# Patient Record
Sex: FEMALE | Race: WHITE | NOT HISPANIC OR LATINO | Employment: STUDENT | URBAN - METROPOLITAN AREA
[De-identification: names, ages, dates, MRNs, and addresses within clinical notes are randomized per-mention and may not be internally consistent; named-entity substitution may affect disease eponyms.]

---

## 2022-12-16 ENCOUNTER — TELEPHONE (OUTPATIENT)
Dept: SCHEDULING | Facility: IMAGING CENTER | Age: 22
End: 2022-12-16

## 2022-12-19 ENCOUNTER — OFFICE VISIT (OUTPATIENT)
Dept: MEDICAL GROUP | Facility: MEDICAL CENTER | Age: 22
End: 2022-12-19
Payer: COMMERCIAL

## 2022-12-19 VITALS
RESPIRATION RATE: 16 BRPM | TEMPERATURE: 98.5 F | SYSTOLIC BLOOD PRESSURE: 130 MMHG | DIASTOLIC BLOOD PRESSURE: 70 MMHG | HEIGHT: 65 IN | OXYGEN SATURATION: 98 % | BODY MASS INDEX: 23.14 KG/M2 | HEART RATE: 101 BPM | WEIGHT: 138.89 LBS

## 2022-12-19 DIAGNOSIS — Q23.1 BICUSPID AORTIC VALVE: ICD-10-CM

## 2022-12-19 DIAGNOSIS — Z11.59 NEED FOR HEPATITIS C SCREENING TEST: ICD-10-CM

## 2022-12-19 DIAGNOSIS — E78.6 LOW HDL (UNDER 40): ICD-10-CM

## 2022-12-19 DIAGNOSIS — Z23 NEED FOR VACCINATION: ICD-10-CM

## 2022-12-19 DIAGNOSIS — K13.70 DISEASE OF THE ORAL SOFT TISSUES: ICD-10-CM

## 2022-12-19 DIAGNOSIS — Z01.84 IMMUNITY STATUS TESTING: ICD-10-CM

## 2022-12-19 DIAGNOSIS — Z11.4 SCREENING FOR HIV (HUMAN IMMUNODEFICIENCY VIRUS): ICD-10-CM

## 2022-12-19 DIAGNOSIS — Z13.1 SCREENING FOR DIABETES MELLITUS: ICD-10-CM

## 2022-12-19 DIAGNOSIS — F41.8 OTHER SPECIFIED ANXIETY DISORDERS: ICD-10-CM

## 2022-12-19 PROCEDURE — 90471 IMMUNIZATION ADMIN: CPT | Performed by: FAMILY MEDICINE

## 2022-12-19 PROCEDURE — 90621 MENB-FHBP VACC 2/3 DOSE IM: CPT | Performed by: FAMILY MEDICINE

## 2022-12-19 PROCEDURE — 99204 OFFICE O/P NEW MOD 45 MIN: CPT | Mod: 25 | Performed by: FAMILY MEDICINE

## 2022-12-19 RX ORDER — MEDROXYPROGESTERONE ACETATE 150 MG/ML
INJECTION, SUSPENSION INTRAMUSCULAR
COMMUNITY
End: 2024-01-11

## 2022-12-19 ASSESSMENT — ENCOUNTER SYMPTOMS
FOCAL WEAKNESS: 0
NERVOUS/ANXIOUS: 0
CHILLS: 0
HEADACHES: 0
VOMITING: 0
DEPRESSION: 0
SINUS PAIN: 0
EYE PAIN: 0
NAUSEA: 0
SENSORY CHANGE: 0
MYALGIAS: 0
COUGH: 0
DIARRHEA: 0
WEIGHT LOSS: 0
SPUTUM PRODUCTION: 0
SHORTNESS OF BREATH: 0
ABDOMINAL PAIN: 0
WHEEZING: 0
DIZZINESS: 0
CONSTIPATION: 0
EYE REDNESS: 0
HEMOPTYSIS: 0
FEVER: 0
EYE DISCHARGE: 0
PALPITATIONS: 0

## 2022-12-19 ASSESSMENT — PATIENT HEALTH QUESTIONNAIRE - PHQ9: CLINICAL INTERPRETATION OF PHQ2 SCORE: 0
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
## 2022-12-19 NOTE — LETTER
[employer]  Phoenix Delacruz M.D.  [address]  [place] [address]  Fax: [phone]   Authorization for Release/Disclosure of   Protected Health Information   Name: MINOR MITCHELL : 2000 SSN: [id number]   Address: [address] [place] [address] [address] [place] [place] [address] Phone:    [phone] (home)    I authorize the entity listed below to release/disclose the PHI below to:   [employer]/Phoenix Delacruz M.D. and Phoenix Delacruz M.D.   Provider or Entity Name:     Address   City, State, Zip   Phone:      Fax:     Reason for request: continuity of care   Information to be released:    [  ] LAST COLONOSCOPY,  including any PATH REPORT and follow-up  [  ] LAST FIT/COLOGUARD RESULT [  ] LAST DEXA  [  ] LAST MAMMOGRAM  [  ] LAST PAP  [  ] LAST LABS [  ] RETINA EXAM REPORT  [  ] IMMUNIZATION RECORDS  [  ] Release all info      [  ] Check here and initial the line next to each item to release ALL health information INCLUDING  _____ Care and treatment for drug and / or alcohol abuse  _____ HIV testing, infection status, or AIDS  _____ Genetic Testing    DATES OF SERVICE OR TIME PERIOD TO BE DISCLOSED: _____________  I understand and acknowledge that:  * This Authorization may be revoked at any time by you in writing, except if your health information has already been used or disclosed.  * Your health information that will be used or disclosed as a result of you signing this authorization could be re-disclosed by the recipient. If this occurs, your re-disclosed health information may no longer be protected by State or Federal laws.  * You may refuse to sign this Authorization. Your refusal will not affect your ability to obtain treatment.  * This Authorization becomes effective upon signing and will  on (date) __________.      If no date is indicated, this Authorization will  one (1) year from the signature date.    Name: Minor Mitchell    Signature:   Date:     2022       PLEASE FAX  REQUESTED RECORDS BACK TO: [phone]

## 2022-12-19 NOTE — LETTER
[employer]  Phoenix Delacruz M.D.  [address]  [place] [address]  Fax: [phone]   Authorization for Release/Disclosure of   Protected Health Information   Name: MINOR MITCHELL : 2000 SSN: [id number]   Address: [address] [place] [address] [address] [place] [address] Phone:    [phone] (home)    I authorize the entity listed below to release/disclose the PHI below to:   [employer]/Phoenix Delacruz M.D. and Phoenix Dleacruz M.D.   Provider or Entity Name:     Address   City, State, Zip   Phone:      Fax:     Reason for request: continuity of care   Information to be released:    [  ] LAST COLONOSCOPY,  including any PATH REPORT and follow-up  [  ] LAST FIT/COLOGUARD RESULT [  ] LAST DEXA  [  ] LAST MAMMOGRAM  [  ] LAST PAP  [  ] LAST LABS [  ] RETINA EXAM REPORT  [  ] IMMUNIZATION RECORDS  [  ] Release all info      [  ] Check here and initial the line next to each item to release ALL health information INCLUDING  _____ Care and treatment for drug and / or alcohol abuse  _____ HIV testing, infection status, or AIDS  _____ Genetic Testing    DATES OF SERVICE OR TIME PERIOD TO BE DISCLOSED: _____________  I understand and acknowledge that:  * This Authorization may be revoked at any time by you in writing, except if your health information has already been used or disclosed.  * Your health information that will be used or disclosed as a result of you signing this authorization could be re-disclosed by the recipient. If this occurs, your re-disclosed health information may no longer be protected by State or Federal laws.  * You may refuse to sign this Authorization. Your refusal will not affect your ability to obtain treatment.  * This Authorization becomes effective upon signing and will  on (date) __________.      If no date is indicated, this Authorization will  one (1) year from the signature date.    Name: Minor Mitchell    Signature:   Date:     2022       PLEASE FAX  REQUESTED RECORDS BACK TO: [phone]

## 2023-01-03 ENCOUNTER — HOSPITAL ENCOUNTER (OUTPATIENT)
Dept: LAB | Facility: MEDICAL CENTER | Age: 23
End: 2023-01-03
Attending: FAMILY MEDICINE
Payer: COMMERCIAL

## 2023-01-03 ENCOUNTER — TELEPHONE (OUTPATIENT)
Dept: HEALTH INFORMATION MANAGEMENT | Facility: OTHER | Age: 23
End: 2023-01-03
Payer: COMMERCIAL

## 2023-01-03 DIAGNOSIS — E78.6 LOW HDL (UNDER 40): ICD-10-CM

## 2023-01-03 DIAGNOSIS — Z13.1 SCREENING FOR DIABETES MELLITUS: ICD-10-CM

## 2023-01-03 DIAGNOSIS — Z11.59 NEED FOR HEPATITIS C SCREENING TEST: ICD-10-CM

## 2023-01-03 DIAGNOSIS — Z11.4 SCREENING FOR HIV (HUMAN IMMUNODEFICIENCY VIRUS): ICD-10-CM

## 2023-01-03 DIAGNOSIS — Z01.84 IMMUNITY STATUS TESTING: ICD-10-CM

## 2023-01-03 LAB
ALBUMIN SERPL BCP-MCNC: 5 G/DL (ref 3.2–4.9)
ALBUMIN/GLOB SERPL: 1.8 G/DL
ALP SERPL-CCNC: 55 U/L (ref 30–99)
ALT SERPL-CCNC: 14 U/L (ref 2–50)
ANION GAP SERPL CALC-SCNC: 13 MMOL/L (ref 7–16)
AST SERPL-CCNC: 19 U/L (ref 12–45)
BILIRUB SERPL-MCNC: 0.7 MG/DL (ref 0.1–1.5)
BUN SERPL-MCNC: 10 MG/DL (ref 8–22)
CALCIUM ALBUM COR SERPL-MCNC: 9.2 MG/DL (ref 8.5–10.5)
CALCIUM SERPL-MCNC: 10 MG/DL (ref 8.4–10.2)
CHLORIDE SERPL-SCNC: 104 MMOL/L (ref 96–112)
CHOLEST SERPL-MCNC: 123 MG/DL (ref 100–199)
CO2 SERPL-SCNC: 24 MMOL/L (ref 20–33)
CREAT SERPL-MCNC: 0.61 MG/DL (ref 0.5–1.4)
FASTING STATUS PATIENT QL REPORTED: NORMAL
GFR SERPLBLD CREATININE-BSD FMLA CKD-EPI: 128 ML/MIN/1.73 M 2
GLOBULIN SER CALC-MCNC: 2.8 G/DL (ref 1.9–3.5)
GLUCOSE SERPL-MCNC: 85 MG/DL (ref 65–99)
HBV SURFACE AB SERPL IA-ACNC: 185 MIU/ML (ref 0–10)
HCV AB SER QL: NORMAL
HDLC SERPL-MCNC: 45 MG/DL
HIV 1+2 AB+HIV1 P24 AG SERPL QL IA: NORMAL
LDLC SERPL CALC-MCNC: 64 MG/DL
POTASSIUM SERPL-SCNC: 3.7 MMOL/L (ref 3.6–5.5)
PROT SERPL-MCNC: 7.8 G/DL (ref 6–8.2)
RUBV AB SER QL: 172 IU/ML
SODIUM SERPL-SCNC: 141 MMOL/L (ref 135–145)
TRIGL SERPL-MCNC: 69 MG/DL (ref 0–149)

## 2023-01-03 PROCEDURE — 86787 VARICELLA-ZOSTER ANTIBODY: CPT

## 2023-01-03 PROCEDURE — 86803 HEPATITIS C AB TEST: CPT

## 2023-01-03 PROCEDURE — 86762 RUBELLA ANTIBODY: CPT

## 2023-01-03 PROCEDURE — 86765 RUBEOLA ANTIBODY: CPT

## 2023-01-03 PROCEDURE — 86735 MUMPS ANTIBODY: CPT

## 2023-01-03 PROCEDURE — 80061 LIPID PANEL: CPT

## 2023-01-03 PROCEDURE — 80053 COMPREHEN METABOLIC PANEL: CPT

## 2023-01-03 PROCEDURE — 36415 COLL VENOUS BLD VENIPUNCTURE: CPT

## 2023-01-03 PROCEDURE — 86706 HEP B SURFACE ANTIBODY: CPT

## 2023-01-03 PROCEDURE — 87389 HIV-1 AG W/HIV-1&-2 AB AG IA: CPT

## 2023-01-06 LAB
MEV IGG SER-ACNC: >300 AU/ML
MUV IGG SER IA-ACNC: >300 AU/ML
VZV IGG SER IA-ACNC: 1753 IV

## 2023-03-02 ENCOUNTER — OFFICE VISIT (OUTPATIENT)
Dept: MEDICAL GROUP | Facility: MEDICAL CENTER | Age: 23
End: 2023-03-02
Payer: COMMERCIAL

## 2023-03-02 VITALS
DIASTOLIC BLOOD PRESSURE: 62 MMHG | BODY MASS INDEX: 23.88 KG/M2 | HEIGHT: 65 IN | WEIGHT: 143.3 LBS | RESPIRATION RATE: 16 BRPM | OXYGEN SATURATION: 96 % | TEMPERATURE: 97.4 F | HEART RATE: 107 BPM | SYSTOLIC BLOOD PRESSURE: 114 MMHG

## 2023-03-02 DIAGNOSIS — F32.1 CURRENT MODERATE EPISODE OF MAJOR DEPRESSIVE DISORDER WITHOUT PRIOR EPISODE (HCC): ICD-10-CM

## 2023-03-02 DIAGNOSIS — E78.6 LOW HDL (UNDER 40): ICD-10-CM

## 2023-03-02 DIAGNOSIS — R77.9 ELEVATED BLOOD PROTEIN: ICD-10-CM

## 2023-03-02 PROCEDURE — 99214 OFFICE O/P EST MOD 30 MIN: CPT | Performed by: FAMILY MEDICINE

## 2023-03-02 RX ORDER — SERTRALINE HYDROCHLORIDE 25 MG/1
25 TABLET, FILM COATED ORAL DAILY
Qty: 60 TABLET | Refills: 1 | Status: SHIPPED | OUTPATIENT
Start: 2023-03-02 | End: 2024-01-11 | Stop reason: SDUPTHER

## 2023-03-02 ASSESSMENT — ENCOUNTER SYMPTOMS
CHILLS: 0
FEVER: 0
INSOMNIA: 1
PALPITATIONS: 0
DEPRESSION: 1

## 2023-03-02 ASSESSMENT — PATIENT HEALTH QUESTIONNAIRE - PHQ9
SUM OF ALL RESPONSES TO PHQ QUESTIONS 1-9: 14
5. POOR APPETITE OR OVEREATING: 3 - NEARLY EVERY DAY
CLINICAL INTERPRETATION OF PHQ2 SCORE: 4

## 2023-03-02 ASSESSMENT — FIBROSIS 4 INDEX: FIB4 SCORE: 0.36

## 2023-03-02 NOTE — PROGRESS NOTES
FAMILY MEDICINE VISIT                                                               Chief complaint::Diagnoses of Low HDL (under 40), Elevated blood protein, and Current moderate episode of major depressive disorder without prior episode (HCC) were pertinent to this visit.    History of present illness: Minor Arita is a 22 y.o. female who presented for lab follow up, depression symptoms    Problem   Elevated Blood Protein    Recent labs showed mildly elevated albumin level, other protein levels are normal.  Kidney function, liver function electrolytes are also normal.     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  She has a lot of stress at home.  She reports that she has history of PTSD and was on Zoloft before, would like to go back on medication as that has been a lot of stress going on.    Depression Screening    Little interest or pleasure in doing things?  2 - more than half the days   Feeling down, depressed , or hopeless? 2 - more than half the days   Trouble falling or staying asleep, or sleeping too much?  2 - more than half the days   Feeling tired or having little energy?  2 - more than half the days   Poor appetite or overeating?  3 - nearly every day   Feeling bad about yourself - or that you are a failure or have let yourself or your family down? 2 - more than half the days   Trouble concentrating on things, such as reading the newspaper or watching television? 1 - several days   Moving or speaking so slowly that other people could have noticed.  Or the opposite - being so fidgety or restless that you have been moving around a lot more than usual?  0 - not at all   Thoughts that you would be better off dead, or of hurting yourself?  0 - not at all   Patient Health Questionnaire Score: 14       If depressive symptoms identified deferred to follow up visit  "unless specifically addressed in assesment and plan.    Interpretation of PHQ-9 Total Score   Score Severity   1-4 No Depression   5-9 Mild Depression   10-14 Moderate Depression   15-19 Moderately Severe Depression   20-27 Severe Depression       Low Hdl (Under 40)    Has previous history of low HDL.  Recent labs showed cholesterol levels in normal range.    Lab Results   Component Value Date/Time    CHOLSTRLTOT 123 01/03/2023 1642    TRIGLYCERIDE 69 01/03/2023 1642    HDL 45 01/03/2023 1642    LDL 64 01/03/2023 1642               Review of systems:     Review of Systems   Constitutional:  Positive for malaise/fatigue. Negative for chills and fever.   Cardiovascular:  Negative for chest pain, palpitations and leg swelling.   Psychiatric/Behavioral:  Positive for depression. The patient has insomnia.       Medications and Allergies:     Current Outpatient Medications   Medication Sig Dispense Refill    sertraline (ZOLOFT) 25 MG tablet Take 1 Tablet by mouth every day. 60 Tablet 1    medroxyPROGESTERone (DEPO-PROVERA) 150 MG/ML Suspension Inject  into the shoulder, thigh, or buttocks.       No current facility-administered medications for this visit.          Vitals:    /62   Pulse (!) 107   Temp 36.3 °C (97.4 °F)   Resp 16   Ht 1.651 m (5' 5\")   Wt 65 kg (143 lb 4.8 oz)   SpO2 96%  Body mass index is 23.85 kg/m².    Physical Exam:     Physical Exam  Constitutional:       Appearance: Normal appearance. She is well-developed and well-groomed.   HENT:      Head: Normocephalic and atraumatic.      Right Ear: External ear normal.      Left Ear: External ear normal.   Eyes:      General:         Right eye: No discharge.         Left eye: No discharge.      Conjunctiva/sclera: Conjunctivae normal.   Cardiovascular:      Rate and Rhythm: Normal rate.   Pulmonary:      Effort: Pulmonary effort is normal. No respiratory distress.   Musculoskeletal:      Cervical back: Neck supple.   Skin:     Findings: No rash. "   Neurological:      Mental Status: She is alert.   Psychiatric:         Mood and Affect: Mood and affect normal.         Behavior: Behavior normal.        Labs:  I reviewed with patient recent labs resulted on 1/3/2023.    Assessment/Plan:         Problem List Items Addressed This Visit       Low HDL (under 40)     Chronic problem, stable, recheck labs in 1 year.  Continue to eat healthy diet and exercise.         Elevated blood protein     New problem, very mildly elevated protein level which could be due to dietary intake.  Recheck labs in 1 year.  If elevated we will do serum protein electrophoresis.         Current moderate episode of major depressive disorder without prior episode (HCC)     New problem, unstable, start Zoloft 25 mg daily.  Discussed side effects of this medication with patient.  We will follow-up with her in 2 months.         Relevant Medications    sertraline (ZOLOFT) 25 MG tablet    Other Relevant Orders    Patient has been identified as having a positive depression screening. Appropriate orders and counseling have been given. (Completed)        Please note that this dictation was created using voice recognition software. I have made every reasonable attempt to correct obvious errors, but I expect that there are errors of grammar and possibly content that I did not discover before finalizing the note.    Follow up in 2 months for medication follow-up.

## 2023-03-02 NOTE — ASSESSMENT & PLAN NOTE
New problem, unstable, start Zoloft 25 mg daily.  Discussed side effects of this medication with patient.  We will follow-up with her in 2 months.

## 2023-03-02 NOTE — ASSESSMENT & PLAN NOTE
New problem, very mildly elevated protein level which could be due to dietary intake.  Recheck labs in 1 year.  If elevated we will do serum protein electrophoresis.

## 2023-03-10 ENCOUNTER — TELEPHONE (OUTPATIENT)
Dept: CARDIOLOGY | Facility: MEDICAL CENTER | Age: 23
End: 2023-03-10
Payer: COMMERCIAL

## 2023-03-10 NOTE — TELEPHONE ENCOUNTER
Pt is coming in for Bicuspid aortic valve. Pt referred by Phoenix Delacruz M.D..    Spoke with patient and confirmed patient has seen previous cardiologist. Per pt was seen at [facility]. Pt will also bringing in records of her own. Per chart review records are not in patients chart. All cardiac records requested for upcoming NP appointment.  Fax confirmation received. Appointment time, date, and location verified with patient.     Pending outside medical records.

## 2023-03-23 ENCOUNTER — OFFICE VISIT (OUTPATIENT)
Dept: CARDIOLOGY | Facility: MEDICAL CENTER | Age: 23
End: 2023-03-23
Payer: COMMERCIAL

## 2023-03-23 VITALS
HEART RATE: 111 BPM | BODY MASS INDEX: 22.99 KG/M2 | HEIGHT: 65 IN | RESPIRATION RATE: 16 BRPM | OXYGEN SATURATION: 98 % | DIASTOLIC BLOOD PRESSURE: 76 MMHG | SYSTOLIC BLOOD PRESSURE: 104 MMHG | WEIGHT: 138 LBS

## 2023-03-23 DIAGNOSIS — Q23.1 BICUSPID AORTIC VALVE: ICD-10-CM

## 2023-03-23 LAB — EKG IMPRESSION: NORMAL

## 2023-03-23 PROCEDURE — 99203 OFFICE O/P NEW LOW 30 MIN: CPT | Performed by: INTERNAL MEDICINE

## 2023-03-23 PROCEDURE — 93000 ELECTROCARDIOGRAM COMPLETE: CPT | Performed by: INTERNAL MEDICINE

## 2023-03-23 ASSESSMENT — ENCOUNTER SYMPTOMS
COUGH: 0
HEMATOCHEZIA: 0
DIAPHORESIS: 0
FEVER: 0
HEMOPTYSIS: 0
WHEEZING: 0

## 2023-03-23 ASSESSMENT — FIBROSIS 4 INDEX: FIB4 SCORE: 0.36

## 2023-03-23 NOTE — PROGRESS NOTES
Cardiology Initial Consultation Note    Date of note: 3/22/2023    Primary Care Provider: Phoenix Delacruz M.D.  Referring Provider: No ref. provider found    Patient Name: Minor Arita  YOB: 2000  MRN:              [id number]    Chief Complaint   Patient presents with    Aortic Stenosis     NP Dx: Bicuspid aortic valve     History of Present Illness: Ms. Minor Arita is a 22 y.o. female whose current medical problems include bicuspid aortic valve and depression who is here for cardiac consultation for follow-up of bicuspid aortic valve.    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  She reports she has no complaints.  She has had chest pain occasionally maybe twice a month for years and it has not changed and not concerning for her.  She denies any shortness of breath, no orthopnea, no PND, no decrease in exercise Tolerance, no lightheadedness, palpitations, dizziness, or syncope.    She reports she used to see her pediatric cardiologist every 3 years where they would do follow-up echoes and she is okay with that plan.  The plan was to have and a follow-up echo around October as it would have been 3 years from her last echo and that is why she is here to establish.    Cardiovascular Risk Factors:  1. Smoking status:   Tobacco Use: Low Risk     Smoking Tobacco Use: Never    Smokeless Tobacco Use: Never    Passive Exposure: Not on file     2. Type II Diabetes Mellitus: No  Lab Results   Component Value Date/Time    HBA1C 4.9 09/06/2022 03:51 PM    HBA1C 5.0 09/17/2020 12:04 PM     3. Hypertension: No  4. Dyslipidemia: No  Cholesterol,Tot   Date Value Ref Range Status   01/03/2023 123 100 - 199 mg/dL Final     LDL   Date Value Ref Range Status   01/03/2023 64 <100 mg/dL Final     HDL   Date Value Ref Range Status   01/03/2023 45 >=40 mg/dL Final     Triglycerides   Date Value Ref Range Status   01/03/2023 69 0 - 149 mg/dL Final     5. Family history of early Coronary Artery Disease  "in a first degree relative (Male less than 55 years of age; Female less than 65 years of age): Although her mother had a heart attack in her 40s, she was abusing drug at the time.  Her brother  unexpectedly while undergoing a valve replacement.    Past Medical History:   Diagnosis Date    Bicuspid aortic valve        Past Surgical History:   Procedure Laterality Date    CLEFT PALATE REPAIR         Current Outpatient Medications   Medication Sig Dispense Refill    sertraline (ZOLOFT) 25 MG tablet Take 1 Tablet by mouth every day. 60 Tablet 1    medroxyPROGESTERone (DEPO-PROVERA) 150 MG/ML Suspension Inject  into the shoulder, thigh, or buttocks.       No current facility-administered medications for this visit.       Allergies   Allergen Reactions    Cephalosporins     Keflex      Hives all over body.       Family History   Problem Relation Age of Onset    Heart Disease Mother         PREMATURE HEART DISEASE AT AGE 45    Other Mother         Marfan disease    Other Father         Crohn's disease    Heart Disease Brother     Diabetes Paternal Uncle     Cancer Maternal Grandfather     Diabetes Paternal Grandfather        Social History     Socioeconomic History    Marital status:    Tobacco Use    Smoking status: Never    Smokeless tobacco: Never   Substance and Sexual Activity    Alcohol use: Yes     Comment: SOCIAL    Drug use: Never    Sexual activity: Yes     Partners: Male     Birth control/protection: Injection     Comment: Study as nurse at [employer]        Review of Systems   Constitutional: Negative for diaphoresis and fever.   Respiratory:  Negative for cough, hemoptysis and wheezing.    Gastrointestinal:  Negative for hematochezia and melena.   Genitourinary:  Negative for hematuria.     Physical Exam:  Ambulatory Vitals  /76 (BP Location: Left arm, Patient Position: Sitting, BP Cuff Size: Adult)   Pulse (!) 111   Resp 16   Ht 1.651 m (5' 5\")   Wt 62.6 kg (138 lb)   SpO2 98%    Oxygen " Therapy:  Pulse Oximetry: 98 %  BP Readings from Last 4 Encounters:   03/23/23 104/76   03/02/23 114/62   12/19/22 130/70       Weight/BMI:   Body mass index is 22.96 kg/m².  Wt Readings from Last 2 Encounters:   03/23/23 62.6 kg (138 lb)   03/02/23 65 kg (143 lb 4.8 oz)       Physical Exam  Constitutional:       Appearance: Normal appearance.   Eyes:      Extraocular Movements: Extraocular movements intact.      Conjunctiva/sclera: Conjunctivae normal.   Neck:      Vascular: No carotid bruit or JVD.   Cardiovascular:      Rate and Rhythm: Normal rate and regular rhythm.      Pulses:           Radial pulses are 2+ on the right side and 2+ on the left side.        Posterior tibial pulses are 1+ on the right side and 1+ on the left side.      Heart sounds: Normal heart sounds. No murmur heard.    No friction rub. No gallop.   Pulmonary:      Effort: Pulmonary effort is normal. No respiratory distress.      Breath sounds: Normal breath sounds. No wheezing.   Abdominal:      General: Bowel sounds are normal.      Palpations: Abdomen is soft.   Musculoskeletal:      Cervical back: Neck supple.      Right lower leg: No edema.      Left lower leg: No edema.   Skin:     General: Skin is warm and dry.   Neurological:      Mental Status: She is alert and oriented to person, place, and time. Mental status is at baseline.   Psychiatric:         Mood and Affect: Mood normal.        Lab Data Review:  Lab Results   Component Value Date/Time    SODIUM 141 01/03/2023 04:42 PM    POTASSIUM 3.7 01/03/2023 04:42 PM    CHLORIDE 104 01/03/2023 04:42 PM    CO2 24 01/03/2023 04:42 PM    GLUCOSE 85 01/03/2023 04:42 PM    BUN 10 01/03/2023 04:42 PM    CREATININE 0.61 01/03/2023 04:42 PM     Lab Results   Component Value Date/Time    ALKPHOSPHAT 55 01/03/2023 04:42 PM    ASTSGOT 19 01/03/2023 04:42 PM    ALTSGPT 14 01/03/2023 04:42 PM    TBILIRUBIN 0.7 01/03/2023 04:42 PM      Lab Results   Component Value Date/Time    WBC 11.3 (H) 09/06/2022  03:51 PM     No results found for: CAROL     Cardiac Imaging and Procedures Review:    EKG dated/23/2 3: My personal interpretation is sinus rhythm, 91 bpm, nonspecific T wave changes    Outside Echo dated 7/20/2020: Review of the report normal left ventricular cavity size, wall thickness, and systolic function.  Normal right ventricular systolic function.  The left and right atrial sizes.  Structurally normal-appearing mitral valve without significant stenosis and trace regurgitation.  The report is a bicuspid aortic valve without significant stenosis or regurgitation.  Grossly normal tricuspid valve.  Normal aortic root size.    Assessment & Plan     1.  Bicuspid aortic valve, report of mildly enlarged aortic root size although last echo did not suggest dilated aortic root.  Clinically she is doing well no new symptoms or complaints.  Cardiac exam is normal.  We will order an echo for October which is about 3 years from her last echo as she is clinically doing well.  In terms of follow-up, she like to call every 3 years to follow-up with cardiology to get her echo rather than once a year to be seen in clinic unless she has any problems.  That sounds reasonable.  - Echocardiogram to follow-up on bicuspid aortic valve, we will MyChart the results.  She knows to call and see cardiology in 3 years, but earlier if there is any problems.    Shared Medical Decision Making:  All of patient's questions were answered to the best of my knowledge and to patient's satisfaction. It was a pleasure seeing Ms. Minor Arita in my clinic today. Return if symptoms worsen or fail to improve. Patient is aware to call the cardiology clinic with any questions or concerns.    Iris Hendrix MD  [facility]  [address]  [place] [address]  Phone:  [phone]  Fax:  [phone]    Please note that this dictation was created using voice recognition software. I have made every  reasonable attempt to correct obvious errors, but it is possible there are errors of grammar and possibly content that I did not discover before finalizing the note.

## 2023-05-04 ENCOUNTER — APPOINTMENT (OUTPATIENT)
Dept: MEDICAL GROUP | Facility: MEDICAL CENTER | Age: 23
End: 2023-05-04
Payer: COMMERCIAL

## 2023-08-10 ENCOUNTER — APPOINTMENT (OUTPATIENT)
Dept: LAB | Facility: MEDICAL CENTER | Age: 23
End: 2023-08-10
Attending: ORTHOPAEDIC SURGERY
Payer: COMMERCIAL

## 2023-08-14 ENCOUNTER — HOSPITAL ENCOUNTER (OUTPATIENT)
Dept: LAB | Facility: MEDICAL CENTER | Age: 23
End: 2023-08-14
Attending: FAMILY MEDICINE
Payer: COMMERCIAL

## 2023-08-14 DIAGNOSIS — Z01.84 IMMUNITY STATUS TESTING: ICD-10-CM

## 2023-08-14 DIAGNOSIS — Z11.1 SCREENING FOR TUBERCULOSIS: ICD-10-CM

## 2023-08-14 LAB — HBV SURFACE AB SERPL IA-ACNC: 5252 MIU/ML (ref 0–10)

## 2023-08-14 PROCEDURE — 86480 TB TEST CELL IMMUN MEASURE: CPT

## 2023-08-14 PROCEDURE — 86706 HEP B SURFACE ANTIBODY: CPT

## 2023-08-14 PROCEDURE — 36415 COLL VENOUS BLD VENIPUNCTURE: CPT

## 2023-08-15 LAB
GAMMA INTERFERON BACKGROUND BLD IA-ACNC: 0.05 IU/ML
M TB IFN-G BLD-IMP: NEGATIVE
M TB IFN-G CD4+ BCKGRND COR BLD-ACNC: 0.03 IU/ML
MITOGEN IGNF BCKGRD COR BLD-ACNC: >10 IU/ML
QFT TB2 - NIL TBQ2: 0.05 IU/ML

## 2023-10-02 ENCOUNTER — APPOINTMENT (OUTPATIENT)
Dept: CARDIOLOGY | Facility: MEDICAL CENTER | Age: 23
End: 2023-10-02
Attending: INTERNAL MEDICINE
Payer: COMMERCIAL

## 2023-12-26 ENCOUNTER — APPOINTMENT (OUTPATIENT)
Dept: MEDICAL GROUP | Facility: MEDICAL CENTER | Age: 23
End: 2023-12-26
Payer: COMMERCIAL

## 2024-01-11 ENCOUNTER — TELEMEDICINE (OUTPATIENT)
Dept: MEDICAL GROUP | Facility: MEDICAL CENTER | Age: 24
End: 2024-01-11
Payer: COMMERCIAL

## 2024-01-11 VITALS — WEIGHT: 126 LBS | BODY MASS INDEX: 20.99 KG/M2 | HEIGHT: 65 IN

## 2024-01-11 DIAGNOSIS — F41.8 OTHER SPECIFIED ANXIETY DISORDERS: ICD-10-CM

## 2024-01-11 DIAGNOSIS — F32.5 MAJOR DEPRESSIVE DISORDER WITH SINGLE EPISODE, IN FULL REMISSION (HCC): ICD-10-CM

## 2024-01-11 PROCEDURE — 99214 OFFICE O/P EST MOD 30 MIN: CPT | Mod: 95 | Performed by: FAMILY MEDICINE

## 2024-01-11 RX ORDER — SERTRALINE HYDROCHLORIDE 25 MG/1
25 TABLET, FILM COATED ORAL DAILY
Qty: 90 TABLET | Refills: 3 | Status: SHIPPED | OUTPATIENT
Start: 2024-01-11

## 2024-01-11 ASSESSMENT — PATIENT HEALTH QUESTIONNAIRE - PHQ9: CLINICAL INTERPRETATION OF PHQ2 SCORE: 0

## 2024-01-11 ASSESSMENT — FIBROSIS 4 INDEX: FIB4 SCORE: 0.37
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
## 2024-05-03 ENCOUNTER — HOSPITAL ENCOUNTER (OUTPATIENT)
Dept: LAB | Facility: MEDICAL CENTER | Age: 24
End: 2024-05-03
Attending: FAMILY MEDICINE
Payer: COMMERCIAL

## 2024-05-03 ENCOUNTER — OFFICE VISIT (OUTPATIENT)
Dept: MEDICAL GROUP | Facility: MEDICAL CENTER | Age: 24
End: 2024-05-03
Payer: COMMERCIAL

## 2024-05-03 VITALS
HEART RATE: 98 BPM | DIASTOLIC BLOOD PRESSURE: 62 MMHG | HEIGHT: 65 IN | WEIGHT: 130.07 LBS | SYSTOLIC BLOOD PRESSURE: 102 MMHG | OXYGEN SATURATION: 99 % | RESPIRATION RATE: 16 BRPM | BODY MASS INDEX: 21.67 KG/M2 | TEMPERATURE: 97.9 F

## 2024-05-03 DIAGNOSIS — K13.70 DISEASE OF THE ORAL SOFT TISSUES: ICD-10-CM

## 2024-05-03 DIAGNOSIS — Q23.1 BICUSPID AORTIC VALVE: ICD-10-CM

## 2024-05-03 DIAGNOSIS — Z32.01 POSITIVE PREGNANCY TEST: ICD-10-CM

## 2024-05-03 LAB — B-HCG SERPL-ACNC: 9432 MIU/ML (ref 0–10)

## 2024-05-03 PROCEDURE — 99214 OFFICE O/P EST MOD 30 MIN: CPT | Performed by: FAMILY MEDICINE

## 2024-05-03 PROCEDURE — 3074F SYST BP LT 130 MM HG: CPT | Performed by: FAMILY MEDICINE

## 2024-05-03 PROCEDURE — 3078F DIAST BP <80 MM HG: CPT | Performed by: FAMILY MEDICINE

## 2024-05-03 RX ORDER — FOLIC ACID 1 MG/1
TABLET ORAL
COMMUNITY
Start: 2024-04-27

## 2024-05-03 ASSESSMENT — ENCOUNTER SYMPTOMS
FEVER: 0
CHILLS: 0
NAUSEA: 1
HEADACHES: 1

## 2024-05-03 ASSESSMENT — PATIENT HEALTH QUESTIONNAIRE - PHQ9
1. LITTLE INTEREST OR PLEASURE IN DOING THINGS: NOT AT ALL
6. FEELING BAD ABOUT YOURSELF - OR THAT YOU ARE A FAILURE OR HAVE LET YOURSELF OR YOUR FAMILY DOWN: NOT AL ALL
4. FEELING TIRED OR HAVING LITTLE ENERGY: NOT AT ALL
7. TROUBLE CONCENTRATING ON THINGS, SUCH AS READING THE NEWSPAPER OR WATCHING TELEVISION: NOT AT ALL
SUM OF ALL RESPONSES TO PHQ9 QUESTIONS 1 AND 2: 0
5. POOR APPETITE OR OVEREATING: NOT AT ALL
3. TROUBLE FALLING OR STAYING ASLEEP OR SLEEPING TOO MUCH: NOT AT ALL
8. MOVING OR SPEAKING SO SLOWLY THAT OTHER PEOPLE COULD HAVE NOTICED. OR THE OPPOSITE, BEING SO FIGETY OR RESTLESS THAT YOU HAVE BEEN MOVING AROUND A LOT MORE THAN USUAL: NOT AT ALL
9. THOUGHTS THAT YOU WOULD BE BETTER OFF DEAD, OR OF HURTING YOURSELF: NOT AT ALL
SUM OF ALL RESPONSES TO PHQ QUESTIONS 1-9: 0
2. FEELING DOWN, DEPRESSED, IRRITABLE, OR HOPELESS: NOT AT ALL

## 2024-05-03 ASSESSMENT — FIBROSIS 4 INDEX: FIB4 SCORE: 0.39

## 2024-05-03 NOTE — PROGRESS NOTES
Verbal consent was acquired by the patient to use [employer] ambient listening note generation during this visit.      Alessandra was seen today for routine prenatal visit.    Diagnoses and all orders for this visit:    Positive pregnancy test  -     HCG QUANTITATIVE; Future    Disease of the oral soft tissues    Bicuspid aortic valve             Assessment & Plan  1. Positive pregnancy test at home.  This is a newly diagnosed condition, which is currently stable. The patient's last menstrual cycle was on 03/27/2024. She is currently 5 weeks, 2 days pregnant, based on her LMP. The STEPHEN is 01/01/2025. A beta-HCG pregnancy test will be conducted. The patient is advised to follow up with her OBGYN.  Continue prenatal vitamins.  Take magnesium citrate 250 mg daily as needed for headaches.    2. Oral soft tissue disease.  This is a chronic condition, which is currently stable. 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 The patient is currently taking folic acid and prenatal vitamins. She is advised to continue these medications.    3. Bicuspid aortic valve.  This is chronic condition and is stable. The patient was seen by cardiology in 03/2024 and has been stable and is currently asymptomatic. A repeat echocardiogram was recommended 3 years from the last echocardiogram. The patient is advised to follow up with cardiology if she develops any symptoms.    Follow-up  The patient is advised to follow up as necessary.          Chief complaint::Diagnoses of Positive pregnancy test, Disease of the oral soft tissues, and Bicuspid aortic valve were pertinent to this visit.      History of Present Illness  The patient is a 24-year-old female who is with her partner today to discuss about positive pregnancy test at home.    The patient's last menstrual period commenced on 03/27/2024. She initially tested positive for pregnancy last Friday, with the most recent test conducted yesterday, both of which yielded a positive  "result. The presence of dark lines in her pregnancy were also noted. She has been experiencing postprandial nausea, breast swelling, and occasional one-sided migraines, although this is not a common occurrence for her. Frequent urination is also reported. She is currently on a regimen of prenatal vitamins and 1 mg of folic acid due to a previous childbirth. She discontinued her birth control in 01/2024 due to prolonged bleeding.  She discontinued Zoloft prior to her pregnancy test.  She has abstained from coffee since her pregnancy, but due to her cardiac condition, she is cautious about her caffeine intake.         Review of Systems   Constitutional:  Negative for chills and fever.   Gastrointestinal:  Positive for nausea.   Genitourinary:  Positive for frequency.   Neurological:  Positive for headaches.          Medications and Allergies:     Current Outpatient Medications   Medication Sig Dispense Refill    folic acid (FOLVITE) 1 MG Tab        No current facility-administered medications for this visit.       /62   Pulse 98   Temp 36.6 °C (97.9 °F)   Resp 16   Ht 1.651 m (5' 5\")   Wt 59 kg (130 lb 1.1 oz)   SpO2 99% , Body mass index is 21.64 kg/m².      Physical Exam  Constitutional:       Appearance: Normal appearance. She is well-developed and well-groomed.   HENT:      Head: Normocephalic and atraumatic.      Right Ear: External ear normal.      Left Ear: External ear normal.   Eyes:      General:         Right eye: No discharge.         Left eye: No discharge.      Conjunctiva/sclera: Conjunctivae normal.   Cardiovascular:      Rate and Rhythm: Normal rate.   Pulmonary:      Effort: Pulmonary effort is normal. No respiratory distress.   Musculoskeletal:      Cervical back: Neck supple.   Skin:     Findings: No rash.   Neurological:      Mental Status: She is alert.   Psychiatric:         Mood and Affect: Mood and affect normal.         Behavior: Behavior normal.      "         Results            Please note that this dictation was created using voice recognition software. I have made every reasonable attempt to correct obvious errors, but I expect that there are errors of grammar and possibly content that I did not discover before finalizing the note.

## 2024-06-18 ENCOUNTER — EH NON-PROVIDER (OUTPATIENT)
Dept: OCCUPATIONAL MEDICINE | Facility: CLINIC | Age: 24
End: 2024-06-18

## 2024-06-18 ENCOUNTER — EMPLOYEE HEALTH (OUTPATIENT)
Dept: OCCUPATIONAL MEDICINE | Facility: CLINIC | Age: 24
End: 2024-06-18

## 2024-06-18 DIAGNOSIS — Z02.89 VISIT FOR OCCUPATIONAL HEALTH EXAMINATION: Primary | ICD-10-CM

## 2024-06-18 DIAGNOSIS — Z02.89 VISIT FOR OCCUPATIONAL HEALTH EXAMINATION: ICD-10-CM

## 2024-06-18 LAB
AMP AMPHETAMINE: NORMAL
BAR BARBITURATES: NORMAL
BZO BENZODIAZEPINES: NORMAL
COC COCAINE: NORMAL
INT CON NEG: NORMAL
INT CON POS: NORMAL
MDMA ECSTASY: NORMAL
MET METHAMPHETAMINES: NORMAL
MTD METHADONE: NORMAL
OPI OPIATES: NORMAL
OXY OXYCODONE: NORMAL
PCP PHENCYCLIDINE: NORMAL
POC URINE DRUG SCREEN OCDRS: NORMAL
THC: NORMAL

## 2024-06-18 PROCEDURE — 94375 RESPIRATORY FLOW VOLUME LOOP: CPT | Performed by: PREVENTIVE MEDICINE

## 2024-06-18 PROCEDURE — 8915 PR COMPREHENSIVE PHYSICAL: Performed by: PREVENTIVE MEDICINE

## 2024-06-18 PROCEDURE — 80305 DRUG TEST PRSMV DIR OPT OBS: CPT | Performed by: PREVENTIVE MEDICINE

## 2024-06-18 NOTE — PROGRESS NOTES
Pre Employment Drug Screen Completed  Mask Fit Required   Docs: Hep B, MMR, VZV, Quantiferon, and Tdap  Physical is required

## 2024-06-25 ENCOUNTER — INITIAL PRENATAL (OUTPATIENT)
Dept: OBGYN | Facility: CLINIC | Age: 24
End: 2024-06-25
Payer: COMMERCIAL

## 2024-06-25 VITALS — BODY MASS INDEX: 22.47 KG/M2 | SYSTOLIC BLOOD PRESSURE: 98 MMHG | DIASTOLIC BLOOD PRESSURE: 68 MMHG | WEIGHT: 135 LBS

## 2024-06-25 DIAGNOSIS — Q35.9 CLEFT PALATE: ICD-10-CM

## 2024-06-25 DIAGNOSIS — Z34.01 ENCOUNTER FOR SUPERVISION OF NORMAL FIRST PREGNANCY IN FIRST TRIMESTER: ICD-10-CM

## 2024-06-25 DIAGNOSIS — Q23.1 BICUSPID AORTIC VALVE: ICD-10-CM

## 2024-06-25 PROCEDURE — 0501F PRENATAL FLOW SHEET: CPT | Performed by: OBSTETRICS & GYNECOLOGY

## 2024-06-25 PROCEDURE — 3078F DIAST BP <80 MM HG: CPT | Performed by: OBSTETRICS & GYNECOLOGY

## 2024-06-25 PROCEDURE — 3074F SYST BP LT 130 MM HG: CPT | Performed by: OBSTETRICS & GYNECOLOGY

## 2024-06-25 ASSESSMENT — EDINBURGH POSTNATAL DEPRESSION SCALE (EPDS)
THINGS HAVE BEEN GETTING ON TOP OF ME: NO, I HAVE BEEN COPING AS WELL AS EVER
I HAVE BEEN ANXIOUS OR WORRIED FOR NO GOOD REASON: HARDLY EVER
I HAVE BEEN SO UNHAPPY THAT I HAVE BEEN CRYING: NO, NEVER
I HAVE FELT SAD OR MISERABLE: NO, NOT AT ALL
I HAVE BEEN SO UNHAPPY THAT I HAVE HAD DIFFICULTY SLEEPING: NOT AT ALL
I HAVE BEEN ABLE TO LAUGH AND SEE THE FUNNY SIDE OF THINGS: AS MUCH AS I ALWAYS COULD
I HAVE FELT SCARED OR PANICKY FOR NO GOOD REASON: NO, NOT AT ALL
I HAVE BLAMED MYSELF UNNECESSARILY WHEN THINGS WENT WRONG: NOT VERY OFTEN
I HAVE LOOKED FORWARD WITH ENJOYMENT TO THINGS: AS MUCH AS I EVER DID
TOTAL SCORE: 2
THE THOUGHT OF HARMING MYSELF HAS OCCURRED TO ME: NEVER

## 2024-06-25 ASSESSMENT — FIBROSIS 4 INDEX: FIB4 SCORE: 0.39

## 2024-06-25 NOTE — PROGRESS NOTES
Establish Pregnancy Visit    CC: First OB Visit    HPI: Patient is a 24 y.o.  at 12 6/7 gestation who presents for her first OB visit.  She is not yet feeling fetal movement.  She denies vaginal bleeding, denies leakage of fluid, denies contractions.   She denies headaches, or urinary symptoms.      Reports mild occasional nausea, sleep disturbances and fatigue    DATING:   Estimated Date of Delivery: None noted.  By LMP (c/w today's US)     GYN HX:   Last Pap:  normal  Hx Moderate or Severe Dysplasia : no  Hx STD : no    OBSTETRIC HISTORY:  OB History    Para Term  AB Living   1             SAB IAB Ectopic Molar Multiple Live Births                    # Outcome Date GA Lbr Leonardo/2nd Weight Sex Delivery Anes PTL Lv   1 Current                MEDICAL HISTORY:  Past Medical History:   Diagnosis Date    Anxiety     Dx'd in 2019    Bicuspid aortic valve     Depression     Dx'd in 2019       MEDICATIONS:  Current Outpatient Medications on File Prior to Visit   Medication Sig Dispense Refill    Prenatal MV-Min-Fe Fum-FA-DHA (PRENATAL 1 PO) Take  by mouth.      folic acid (FOLVITE) 1 MG Tab        No current facility-administered medications on file prior to visit.       FAMILY HISTORY:  Family History   Problem Relation Age of Onset    Heart Disease Mother         PREMATURE HEART DISEASE AT AGE 45    Other Mother         Marfan disease    Other Father         Crohn's disease    Heart Disease Brother     Diabetes Paternal Uncle     Cancer Maternal Grandfather     Diabetes Paternal Grandfather        SURGICAL HISTORY:  Past Surgical History:   Procedure Laterality Date    CLEFT PALATE REPAIR         ALLERGIES / REACTIONS:  Allergies   Allergen Reactions    Cephalosporins     Keflex      Hives all over body.                SOCIAL HISTORY:   reports that she has never smoked. She has never used smokeless tobacco. She reports that she does not currently use alcohol. She reports that she does not currently  use drugs.    ROS:   Gen: no fevers or chills, no significant weight loss or gain, excessive fatigue  Respiratory:  no cough or dyspnea  Cardiac:  no chest pain, no palpitations, no syncope  Breast: no breast discharge, pain, lump or skin changes  GI:  no heartburn, no abdominal pain, no nausea or vomiting  Urinary: no dysuria, urgency, frequency, incontinence   Psych: no depression or anxiety  Neuro: no migraines with aura, fainting spells, numbness or tingling  Extremities: no joint pain, persistently swollen ankles, recurrent leg cramps         PHYSICAL EXAMINATION:  Vital Signs:   Vitals:    06/25/24 0923   BP: 98/68   Weight: 135 lb     Body mass index is 22.47 kg/m².  Constitutional: The patient is well developed and well nourished.  Psychiatric: Patient is oriented to time place and person.   Skin: No rash observed.  Neck: Neck appears symmetric. There are no masses or adenopathy present.  Respiratory: normal effort  Abdomen: Soft, non-tender.  Pelvic:    Deferred  Extremeties: Legs are symmetric and without tenderness. There is no edema present.    Patient's last menstrual period was 03/27/2024. --> STEPHEN 1/1/25  Bedside TVUS - SIUP + FHTs 13 1/7 by CRL. --> STEPHEN 12/30/24    ACOG SCREENING  Infection Prevention  1. High Risk For HIV: No 6. Rash Or Illness Since LMP: No     2. High Risk For Hepatitis B or C: No 7. History Of STD, GC, Chlamydia, HPV Syphilis: No     3. Live With Someone With TB Or Exposed To TB: No 8. Have a cat in the home?: No     4. Patient Or Partner Has A History Of Herpes: No      5. History of Chicken Pox: No 9. Other (See Comments Below): No   Comments: FOB involved and supportive         Genetic Screening/Teratology Counseling- Includes patient, baby's father, or anyone in either family with:  Patient's age 35 years or older as of estimated date of delivery: No     Thalassemia (Italian, Greek, Mediterranean, or  background): MCV less than 80: No     Neural tube defect  (Meningomyelocele, Spina bifida, or Anencephaly): No     Congenital heart defect: Yes     Down syndrome: No     Jose-Sachs (Ashkenazi Zoroastrianism, Cajun, Malagasy [place]): No     Canavan disease (Ashkenazi Zoroastrianism): No     Familial dysautonomia (Ashkenazi Zoroastrianism): No     Sickle cell disease or trait (): No     Hemophilia or other blood disorders: No     Muscular dystrophy: No    Cystic fibrosis: No     Kaylyn's chorea: No     Mental retardation/autism: No     Other inherited genetic or chromosomal disorder: No     Maternal metabolic disorder (eg. Type 1 diabetes, PKU): No     Patient or baby's father had child with birth defects not listed above: No     Recurrent pregnancy loss, or a stillbirth: No     Medications (including supplements, vitamins, herbs, or OTC drugs)/illicit/recreational drugs/alcohol since last menstrual period: No                 ASSESSMENT AND PLAN:  Patient Active Problem List    Diagnosis Date Noted    Elevated blood protein 2023    Major depressive disorder with single episode, in full remission (HCC) 2023    Low HDL (under 40) 2022    Other specified anxiety disorders 2020    Disease of the oral soft tissues 2006    Bicuspid aortic valve 07/15/2005       24 y.o.  at 12    - PNL and std screening ordered  - Dating reviewed: Dated by LMP c/w today's US - final STEPHEN 25  - Discussed options for genetic/aneuploidy testing and information given for pt to consider.  Advised to call insurance for cost of testing.           - she currently desires aneuploidy testing.           - she currently desires CF/SMA testing.  - Hx cleft palate - will refer to perinatology later this pregnancy  - maternal bicuspid aortic valve - f/u with cardiologist Dr. Hendrix. Plan to also refer to perinatology later in pregnancy. Will need fetal echo later this pregnancy.   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   - Discussed office policies, prenatal care timeline, weight gain, diet and activity.  - Taking PNV.  - Increase water intake and encouraged healthy nutrition. Encouraged moderate exercise may continue into final trimester.     Return in 4 weeks for next prenatal visit    Time spent: 30 minutes    Rojelio Cormier M.D.

## 2024-06-25 NOTE — PROGRESS NOTES
NOB visit  LMP: 03/27/2024  Pt states no complaints or concerns today   Good # [phone]    EPDS Score: 2

## 2024-06-27 ENCOUNTER — APPOINTMENT (OUTPATIENT)
Dept: LAB | Facility: MEDICAL CENTER | Age: 24
End: 2024-06-27
Attending: OBSTETRICS & GYNECOLOGY
Payer: COMMERCIAL

## 2024-07-02 ENCOUNTER — HOSPITAL ENCOUNTER (OUTPATIENT)
Dept: LAB | Facility: MEDICAL CENTER | Age: 24
End: 2024-07-02
Attending: OBSTETRICS & GYNECOLOGY
Payer: COMMERCIAL

## 2024-07-02 DIAGNOSIS — Z34.01 ENCOUNTER FOR SUPERVISION OF NORMAL FIRST PREGNANCY IN FIRST TRIMESTER: ICD-10-CM

## 2024-07-02 LAB
ABO GROUP BLD: NORMAL
BLD GP AB SCN SERPL QL: NORMAL
ERYTHROCYTE [DISTWIDTH] IN BLOOD BY AUTOMATED COUNT: 41.3 FL (ref 35.9–50)
HBV SURFACE AG SER QL: ABNORMAL
HCT VFR BLD AUTO: 42.2 % (ref 37–47)
HCV AB SER QL: ABNORMAL
HGB BLD-MCNC: 14.6 G/DL (ref 12–16)
HIV 1+2 AB+HIV1 P24 AG SERPL QL IA: NORMAL
MCH RBC QN AUTO: 31.9 PG (ref 27–33)
MCHC RBC AUTO-ENTMCNC: 34.6 G/DL (ref 32.2–35.5)
MCV RBC AUTO: 92.1 FL (ref 81.4–97.8)
PLATELET # BLD AUTO: 283 K/UL (ref 164–446)
PMV BLD AUTO: 9.3 FL (ref 9–12.9)
RBC # BLD AUTO: 4.58 M/UL (ref 4.2–5.4)
RH BLD: NORMAL
RUBV AB SER QL: 80.2 IU/ML
T PALLIDUM AB SER QL IA: ABNORMAL
WBC # BLD AUTO: 12.8 K/UL (ref 4.8–10.8)

## 2024-07-02 PROCEDURE — 85027 COMPLETE CBC AUTOMATED: CPT

## 2024-07-02 PROCEDURE — 80307 DRUG TEST PRSMV CHEM ANLYZR: CPT

## 2024-07-02 PROCEDURE — 86850 RBC ANTIBODY SCREEN: CPT

## 2024-07-02 PROCEDURE — 86762 RUBELLA ANTIBODY: CPT

## 2024-07-02 PROCEDURE — 81329 SMN1 GENE DOS/DELETION ALYS: CPT

## 2024-07-02 PROCEDURE — 81243 FMR1 GEN ALY DETC ABNL ALLEL: CPT

## 2024-07-02 PROCEDURE — 86803 HEPATITIS C AB TEST: CPT

## 2024-07-02 PROCEDURE — 86901 BLOOD TYPING SEROLOGIC RH(D): CPT

## 2024-07-02 PROCEDURE — 87086 URINE CULTURE/COLONY COUNT: CPT

## 2024-07-02 PROCEDURE — 81408 MOPATH PROCEDURE LEVEL 9: CPT

## 2024-07-02 PROCEDURE — 87340 HEPATITIS B SURFACE AG IA: CPT

## 2024-07-02 PROCEDURE — 87491 CHLMYD TRACH DNA AMP PROBE: CPT

## 2024-07-02 PROCEDURE — 86780 TREPONEMA PALLIDUM: CPT

## 2024-07-02 PROCEDURE — 87389 HIV-1 AG W/HIV-1&-2 AB AG IA: CPT

## 2024-07-02 PROCEDURE — 87591 N.GONORRHOEAE DNA AMP PROB: CPT

## 2024-07-02 PROCEDURE — 86900 BLOOD TYPING SEROLOGIC ABO: CPT

## 2024-07-02 PROCEDURE — 36415 COLL VENOUS BLD VENIPUNCTURE: CPT

## 2024-07-02 PROCEDURE — 81220 CFTR GENE COM VARIANTS: CPT

## 2024-07-03 LAB
C TRACH DNA SPEC QL NAA+PROBE: NEGATIVE
N GONORRHOEA DNA SPEC QL NAA+PROBE: NEGATIVE
SPECIMEN SOURCE: NORMAL

## 2024-07-04 LAB
AMPHET CTO UR CFM-MCNC: NEGATIVE NG/ML
BARBITURATES CTO UR CFM-MCNC: NEGATIVE NG/ML
BENZODIAZ CTO UR CFM-MCNC: NEGATIVE NG/ML
CANNABINOIDS CTO UR CFM-MCNC: NEGATIVE NG/ML
COCAINE CTO UR CFM-MCNC: NEGATIVE NG/ML
CREAT UR-MCNC: 135.4 MG/DL (ref 20–400)
DRUG COMMENT 753798: NORMAL
METHADONE CTO UR CFM-MCNC: NEGATIVE NG/ML
OPIATES CTO UR CFM-MCNC: NEGATIVE NG/ML
PCP CTO UR CFM-MCNC: NEGATIVE NG/ML
PROPOXYPH CTO UR CFM-MCNC: NEGATIVE NG/ML

## 2024-07-05 LAB
BACTERIA UR CULT: NORMAL
SIGNIFICANT IND 70042: NORMAL
SITE SITE: NORMAL
SOURCE SOURCE: NORMAL

## 2024-07-11 LAB
Lab: NORMAL
NTRA 1P36 DELETION SYNDROME POPULATION-BASED RISK TEXT: NORMAL
NTRA 1P36 DELETION SYNDROME RESULT TEXT: NORMAL
NTRA 1P36 DELETION SYNDROME RISK SCORE TEXT: NORMAL
NTRA 22Q11.2 DELETION SYNDROME POPULATION-BASED RISK TEXT: NORMAL
NTRA 22Q11.2 DELETION SYNDROME RESULT TEXT: NORMAL
NTRA 22Q11.2 DELETION SYNDROME RISK SCORE TEXT: NORMAL
NTRA ANGELMAN SYNDROME POPULATION-BASED RISK TEXT: NORMAL
NTRA ANGELMAN SYNDROME RESULT TEXT: NORMAL
NTRA ANGELMAN SYNDROME RISK SCORE TEXT: NORMAL
NTRA CRI-DU-CHAT SYNDROME POPULATION-BASED RISK TEXT: NORMAL
NTRA CRI-DU-CHAT SYNDROME RESULT TEXT: NORMAL
NTRA CRI-DU-CHAT SYNDROME RISK SCORE TEXT: NORMAL
NTRA FETAL FRACTION: NORMAL
NTRA GENDER OF FETUS: NORMAL
NTRA MONOSOMY X AGE-BASED RISK TEXT: NORMAL
NTRA MONOSOMY X RESULT TEXT: NORMAL
NTRA MONOSOMY X RISK SCORE TEXT: NORMAL
NTRA PRADER-WILLI SYNDROME POPULATION-BASED RISK TEXT: NORMAL
NTRA PRADER-WILLI SYNDROME RESULT TEXT: NORMAL
NTRA PRADER-WILLI SYNDROME RISK SCORE TEXT: NORMAL
NTRA TRIPLOIDY RESULT TEXT: NORMAL
NTRA TRISOMY 13 AGE-BASED RISK TEXT: NORMAL
NTRA TRISOMY 13 RESULT TEXT: NORMAL
NTRA TRISOMY 13 RISK SCORE TEXT: NORMAL
NTRA TRISOMY 18 AGE-BASED RISK TEXT: NORMAL
NTRA TRISOMY 18 RESULT TEXT: NORMAL
NTRA TRISOMY 18 RISK SCORE TEXT: NORMAL
NTRA TRISOMY 21 AGE-BASED RISK TEXT: NORMAL
NTRA TRISOMY 21 RESULT TEXT: NORMAL
NTRA TRISOMY 21 RISK SCORE TEXT: NORMAL

## 2024-07-22 ENCOUNTER — APPOINTMENT (OUTPATIENT)
Dept: OBGYN | Facility: CLINIC | Age: 24
End: 2024-07-22
Payer: COMMERCIAL

## 2024-07-22 ENCOUNTER — ROUTINE PRENATAL (OUTPATIENT)
Dept: OBGYN | Facility: CLINIC | Age: 24
End: 2024-07-22
Payer: COMMERCIAL

## 2024-07-22 VITALS — BODY MASS INDEX: 22.27 KG/M2 | DIASTOLIC BLOOD PRESSURE: 78 MMHG | SYSTOLIC BLOOD PRESSURE: 109 MMHG | WEIGHT: 133.8 LBS

## 2024-07-22 DIAGNOSIS — Z87.730 HISTORY OF CLEFT PALATE: ICD-10-CM

## 2024-07-22 DIAGNOSIS — O09.92 SUPERVISION OF HIGH RISK PREGNANCY, UNSPECIFIED, SECOND TRIMESTER: ICD-10-CM

## 2024-07-22 DIAGNOSIS — Z3A.16 16 WEEKS GESTATION OF PREGNANCY: ICD-10-CM

## 2024-07-22 DIAGNOSIS — Q23.1 BICUSPID AORTIC VALVE: ICD-10-CM

## 2024-07-22 LAB
PATHOLOGY STUDY: NORMAL
REF LAB TEST RESULTS: NORMAL

## 2024-07-22 PROCEDURE — 3074F SYST BP LT 130 MM HG: CPT | Performed by: STUDENT IN AN ORGANIZED HEALTH CARE EDUCATION/TRAINING PROGRAM

## 2024-07-22 PROCEDURE — 0502F SUBSEQUENT PRENATAL CARE: CPT | Performed by: STUDENT IN AN ORGANIZED HEALTH CARE EDUCATION/TRAINING PROGRAM

## 2024-07-22 PROCEDURE — 3078F DIAST BP <80 MM HG: CPT | Performed by: STUDENT IN AN ORGANIZED HEALTH CARE EDUCATION/TRAINING PROGRAM

## 2024-07-22 RX ORDER — SERTRALINE HYDROCHLORIDE 25 MG/1
TABLET, FILM COATED ORAL
COMMUNITY
Start: 2024-06-08 | End: 2024-07-30

## 2024-07-22 ASSESSMENT — FIBROSIS 4 INDEX: FIB4 SCORE: 0.45

## 2024-07-30 ENCOUNTER — HOSPITAL ENCOUNTER (OUTPATIENT)
Dept: LAB | Facility: MEDICAL CENTER | Age: 24
End: 2024-07-30
Attending: STUDENT IN AN ORGANIZED HEALTH CARE EDUCATION/TRAINING PROGRAM
Payer: COMMERCIAL

## 2024-07-30 ENCOUNTER — HOSPITAL ENCOUNTER (OUTPATIENT)
Dept: LAB | Facility: MEDICAL CENTER | Age: 24
End: 2024-07-30
Attending: FAMILY MEDICINE
Payer: COMMERCIAL

## 2024-07-30 ENCOUNTER — OFFICE VISIT (OUTPATIENT)
Dept: MEDICAL GROUP | Facility: MEDICAL CENTER | Age: 24
End: 2024-07-30
Payer: COMMERCIAL

## 2024-07-30 VITALS
SYSTOLIC BLOOD PRESSURE: 106 MMHG | DIASTOLIC BLOOD PRESSURE: 72 MMHG | HEART RATE: 98 BPM | TEMPERATURE: 97.5 F | WEIGHT: 135.58 LBS | BODY MASS INDEX: 22.59 KG/M2 | OXYGEN SATURATION: 98 % | HEIGHT: 65 IN

## 2024-07-30 DIAGNOSIS — Z3A.16 16 WEEKS GESTATION OF PREGNANCY: ICD-10-CM

## 2024-07-30 DIAGNOSIS — F32.5 MAJOR DEPRESSIVE DISORDER WITH SINGLE EPISODE, IN FULL REMISSION (HCC): ICD-10-CM

## 2024-07-30 DIAGNOSIS — Z11.1 SCREENING FOR TUBERCULOSIS: ICD-10-CM

## 2024-07-30 DIAGNOSIS — Q23.1 BICUSPID AORTIC VALVE: ICD-10-CM

## 2024-07-30 DIAGNOSIS — F41.8 OTHER SPECIFIED ANXIETY DISORDERS: ICD-10-CM

## 2024-07-30 PROCEDURE — 81511 FTL CGEN ABNOR FOUR ANAL: CPT

## 2024-07-30 PROCEDURE — 86480 TB TEST CELL IMMUN MEASURE: CPT

## 2024-07-30 PROCEDURE — 36415 COLL VENOUS BLD VENIPUNCTURE: CPT

## 2024-07-30 ASSESSMENT — ENCOUNTER SYMPTOMS
PALPITATIONS: 0
CHILLS: 0
FEVER: 0

## 2024-07-30 ASSESSMENT — FIBROSIS 4 INDEX: FIB4 SCORE: 0.45

## 2024-07-31 ENCOUNTER — OFFICE VISIT (OUTPATIENT)
Dept: CARDIOLOGY | Facility: MEDICAL CENTER | Age: 24
End: 2024-07-31
Attending: NURSE PRACTITIONER
Payer: COMMERCIAL

## 2024-07-31 VITALS
WEIGHT: 134 LBS | HEIGHT: 65 IN | OXYGEN SATURATION: 98 % | DIASTOLIC BLOOD PRESSURE: 60 MMHG | HEART RATE: 111 BPM | RESPIRATION RATE: 16 BRPM | BODY MASS INDEX: 22.33 KG/M2 | SYSTOLIC BLOOD PRESSURE: 100 MMHG

## 2024-07-31 DIAGNOSIS — Q23.1 BICUSPID AORTIC VALVE: ICD-10-CM

## 2024-07-31 DIAGNOSIS — E78.6 LOW HDL (UNDER 40): ICD-10-CM

## 2024-07-31 LAB
GAMMA INTERFERON BACKGROUND BLD IA-ACNC: 0.03 IU/ML
M TB IFN-G BLD-IMP: NEGATIVE
M TB IFN-G CD4+ BCKGRND COR BLD-ACNC: 0.01 IU/ML
MITOGEN IGNF BCKGRD COR BLD-ACNC: >10 IU/ML
QFT TB2 - NIL TBQ2: 0 IU/ML

## 2024-07-31 PROCEDURE — 99211 OFF/OP EST MAY X REQ PHY/QHP: CPT | Performed by: NURSE PRACTITIONER

## 2024-07-31 PROCEDURE — 99202 OFFICE O/P NEW SF 15 MIN: CPT | Performed by: NURSE PRACTITIONER

## 2024-07-31 ASSESSMENT — ENCOUNTER SYMPTOMS
DIZZINESS: 0
MYALGIAS: 0
ABDOMINAL PAIN: 0
ORTHOPNEA: 0
CLAUDICATION: 0
SHORTNESS OF BREATH: 0
FEVER: 0
COUGH: 0
PALPITATIONS: 0
PND: 0

## 2024-07-31 ASSESSMENT — FIBROSIS 4 INDEX: FIB4 SCORE: 0.45

## 2024-08-01 LAB
# FETUSES US: NORMAL
AFP MOM SERPL: 2.05
AFP SERPL-MCNC: 98 NG/ML
AGE - REPORTED: 25.7 YR
CURRENT SMOKER: NO
FAMILY MEMBER DISEASES HX: NO
GA METHOD: NORMAL
GA: NORMAL WK
HCG MOM SERPL: 0.65
HCG SERPL-ACNC: NORMAL IU/L
HX OF HEREDITARY DISORDERS: NO
IDDM PATIENT QL: NO
INHIBIN A MOM SERPL: 0.6
INHIBIN A SERPL-MCNC: 95 PG/ML
INTEGRATED SCN PATIENT-IMP: NORMAL
PATHOLOGY STUDY: NORMAL
SPECIMEN DRAWN SERPL: NORMAL
U ESTRIOL MOM SERPL: 1.06
U ESTRIOL SERPL-MCNC: 2.35 NG/ML

## 2024-08-14 ENCOUNTER — ANCILLARY PROCEDURE (OUTPATIENT)
Dept: MATERNAL FETAL MEDICINE | Facility: MEDICAL CENTER | Age: 24
End: 2024-08-14
Payer: COMMERCIAL

## 2024-08-14 ENCOUNTER — APPOINTMENT (OUTPATIENT)
Dept: MATERNAL FETAL MEDICINE | Facility: MEDICAL CENTER | Age: 24
End: 2024-08-14
Attending: OBSTETRICS & GYNECOLOGY
Payer: COMMERCIAL

## 2024-08-14 VITALS
HEART RATE: 89 BPM | WEIGHT: 138.4 LBS | SYSTOLIC BLOOD PRESSURE: 115 MMHG | DIASTOLIC BLOOD PRESSURE: 72 MMHG | BODY MASS INDEX: 23.03 KG/M2

## 2024-08-14 DIAGNOSIS — Z82.79 FAMILY HISTORY OF BIRTH DEFECTS: ICD-10-CM

## 2024-08-14 DIAGNOSIS — Z87.730 HISTORY OF CLEFT PALATE: ICD-10-CM

## 2024-08-14 DIAGNOSIS — O09.92 SUPERVISION OF HIGH RISK PREGNANCY, UNSPECIFIED, SECOND TRIMESTER: ICD-10-CM

## 2024-08-14 DIAGNOSIS — Q23.1 BICUSPID AORTIC VALVE: ICD-10-CM

## 2024-08-14 DIAGNOSIS — Q24.9 MATERNAL CONGENITAL CARDIAC ANOMALY AFFECTING PREGNANCY IN SECOND TRIMESTER, ANTEPARTUM: ICD-10-CM

## 2024-08-14 DIAGNOSIS — Z3A.20 20 WEEKS GESTATION OF PREGNANCY: ICD-10-CM

## 2024-08-14 DIAGNOSIS — O99.891 MATERNAL CONGENITAL CARDIAC ANOMALY AFFECTING PREGNANCY IN SECOND TRIMESTER, ANTEPARTUM: ICD-10-CM

## 2024-08-14 PROCEDURE — 76811 OB US DETAILED SNGL FETUS: CPT | Performed by: OBSTETRICS & GYNECOLOGY

## 2024-08-14 PROCEDURE — 76826 ECHO EXAM OF FETAL HEART: CPT | Performed by: OBSTETRICS & GYNECOLOGY

## 2024-08-14 PROCEDURE — 76828 ECHO EXAM OF FETAL HEART: CPT | Performed by: OBSTETRICS & GYNECOLOGY

## 2024-08-14 PROCEDURE — 99203 OFFICE O/P NEW LOW 30 MIN: CPT | Performed by: OBSTETRICS & GYNECOLOGY

## 2024-08-14 PROCEDURE — 76817 TRANSVAGINAL US OBSTETRIC: CPT | Performed by: OBSTETRICS & GYNECOLOGY

## 2024-08-14 ASSESSMENT — FIBROSIS 4 INDEX: FIB4 SCORE: 0.45

## 2024-08-20 ENCOUNTER — HOSPITAL ENCOUNTER (OUTPATIENT)
Dept: CARDIOLOGY | Facility: MEDICAL CENTER | Age: 24
End: 2024-08-20
Attending: NURSE PRACTITIONER
Payer: COMMERCIAL

## 2024-08-20 DIAGNOSIS — Q23.1 BICUSPID AORTIC VALVE: ICD-10-CM

## 2024-08-20 LAB
LV EJECT FRACT  99904: 60
LV EJECT FRACT MOD 2C 99903: 71.23
LV EJECT FRACT MOD 4C 99902: 55.58
LV EJECT FRACT MOD BP 99901: 63.11

## 2024-08-20 PROCEDURE — 93306 TTE W/DOPPLER COMPLETE: CPT | Mod: 26 | Performed by: INTERNAL MEDICINE

## 2024-08-20 PROCEDURE — 93306 TTE W/DOPPLER COMPLETE: CPT

## 2024-08-21 ENCOUNTER — PATIENT MESSAGE (OUTPATIENT)
Dept: CARDIOLOGY | Facility: MEDICAL CENTER | Age: 24
End: 2024-08-21
Payer: COMMERCIAL

## 2024-08-22 ENCOUNTER — TELEPHONE (OUTPATIENT)
Dept: MATERNAL FETAL MEDICINE | Facility: MEDICAL CENTER | Age: 24
End: 2024-08-22
Payer: COMMERCIAL

## 2024-09-03 ENCOUNTER — APPOINTMENT (OUTPATIENT)
Dept: MATERNAL FETAL MEDICINE | Facility: MEDICAL CENTER | Age: 24
End: 2024-09-03
Payer: COMMERCIAL

## 2024-09-03 VITALS
HEART RATE: 82 BPM | DIASTOLIC BLOOD PRESSURE: 72 MMHG | BODY MASS INDEX: 23.68 KG/M2 | SYSTOLIC BLOOD PRESSURE: 107 MMHG | WEIGHT: 142.3 LBS

## 2024-09-03 DIAGNOSIS — Z82.79 FAMILY HISTORY OF CONGENITAL HEART DISEASE IN MOTHER: ICD-10-CM

## 2024-09-03 DIAGNOSIS — Z87.730 HISTORY OF CLEFT PALATE: ICD-10-CM

## 2024-09-03 DIAGNOSIS — Q23.1 BICUSPID AORTIC VALVE: ICD-10-CM

## 2024-09-03 DIAGNOSIS — O09.92 SUPERVISION OF HIGH RISK PREGNANCY, UNSPECIFIED, SECOND TRIMESTER: ICD-10-CM

## 2024-09-03 DIAGNOSIS — Z3A.22 22 WEEKS GESTATION OF PREGNANCY: ICD-10-CM

## 2024-09-03 PROCEDURE — 76825 ECHO EXAM OF FETAL HEART: CPT | Performed by: OBSTETRICS & GYNECOLOGY

## 2024-09-03 PROCEDURE — 93325 DOPPLER ECHO COLOR FLOW MAPG: CPT | Performed by: OBSTETRICS & GYNECOLOGY

## 2024-09-03 PROCEDURE — 76827 ECHO EXAM OF FETAL HEART: CPT | Performed by: OBSTETRICS & GYNECOLOGY

## 2024-09-03 ASSESSMENT — FIBROSIS 4 INDEX: FIB4 SCORE: 0.45

## 2024-09-04 ENCOUNTER — ROUTINE PRENATAL (OUTPATIENT)
Dept: OBGYN | Facility: CLINIC | Age: 24
End: 2024-09-04
Payer: COMMERCIAL

## 2024-09-04 VITALS — SYSTOLIC BLOOD PRESSURE: 115 MMHG | DIASTOLIC BLOOD PRESSURE: 76 MMHG | BODY MASS INDEX: 24 KG/M2 | WEIGHT: 144.2 LBS

## 2024-09-04 DIAGNOSIS — O26.892 RH NEGATIVE STATUS DURING PREGNANCY IN SECOND TRIMESTER: ICD-10-CM

## 2024-09-04 DIAGNOSIS — Z67.91 RH NEGATIVE STATUS DURING PREGNANCY IN SECOND TRIMESTER: ICD-10-CM

## 2024-09-04 DIAGNOSIS — Z87.730 HISTORY OF CLEFT PALATE: ICD-10-CM

## 2024-09-04 PROCEDURE — 0502F SUBSEQUENT PRENATAL CARE: CPT | Performed by: OBSTETRICS & GYNECOLOGY

## 2024-09-04 PROCEDURE — 3078F DIAST BP <80 MM HG: CPT | Performed by: OBSTETRICS & GYNECOLOGY

## 2024-09-04 PROCEDURE — 3074F SYST BP LT 130 MM HG: CPT | Performed by: OBSTETRICS & GYNECOLOGY

## 2024-09-04 RX ORDER — ASPIRIN 81 MG/1
81 TABLET ORAL DAILY
COMMUNITY

## 2024-09-04 ASSESSMENT — FIBROSIS 4 INDEX: FIB4 SCORE: 0.45

## 2024-09-04 NOTE — PROGRESS NOTES
Pt's here for OB follow-up  Reports + fetal movement good/active  No VB, LOF or UC's.  Confirmed good ph [phone]  Pharmacy, drug allergy, and medications on file.  Pt states no complaints for today.

## 2024-10-04 ENCOUNTER — ROUTINE PRENATAL (OUTPATIENT)
Dept: OBGYN | Facility: CLINIC | Age: 24
End: 2024-10-04
Payer: COMMERCIAL

## 2024-10-04 VITALS — WEIGHT: 152 LBS | DIASTOLIC BLOOD PRESSURE: 59 MMHG | SYSTOLIC BLOOD PRESSURE: 131 MMHG | BODY MASS INDEX: 25.29 KG/M2

## 2024-10-04 DIAGNOSIS — O26.892 RH NEGATIVE STATUS DURING PREGNANCY IN SECOND TRIMESTER: ICD-10-CM

## 2024-10-04 DIAGNOSIS — Z67.91 RH NEGATIVE STATUS DURING PREGNANCY IN SECOND TRIMESTER: ICD-10-CM

## 2024-10-04 PROCEDURE — 90715 TDAP VACCINE 7 YRS/> IM: CPT | Mod: JZ | Performed by: OBSTETRICS & GYNECOLOGY

## 2024-10-04 PROCEDURE — 90471 IMMUNIZATION ADMIN: CPT | Performed by: OBSTETRICS & GYNECOLOGY

## 2024-10-04 PROCEDURE — 3078F DIAST BP <80 MM HG: CPT | Performed by: OBSTETRICS & GYNECOLOGY

## 2024-10-04 PROCEDURE — 0502F SUBSEQUENT PRENATAL CARE: CPT | Performed by: OBSTETRICS & GYNECOLOGY

## 2024-10-04 PROCEDURE — 3075F SYST BP GE 130 - 139MM HG: CPT | Performed by: OBSTETRICS & GYNECOLOGY

## 2024-10-04 ASSESSMENT — FIBROSIS 4 INDEX: FIB4 SCORE: 0.45

## 2024-10-09 ENCOUNTER — HOSPITAL ENCOUNTER (OUTPATIENT)
Dept: LAB | Facility: MEDICAL CENTER | Age: 24
End: 2024-10-09
Attending: OBSTETRICS & GYNECOLOGY
Payer: COMMERCIAL

## 2024-10-09 DIAGNOSIS — Z67.91 RH NEGATIVE STATUS DURING PREGNANCY IN SECOND TRIMESTER: ICD-10-CM

## 2024-10-09 DIAGNOSIS — O26.892 RH NEGATIVE STATUS DURING PREGNANCY IN SECOND TRIMESTER: ICD-10-CM

## 2024-10-09 LAB
ERYTHROCYTE [DISTWIDTH] IN BLOOD BY AUTOMATED COUNT: 43.5 FL (ref 35.9–50)
GLUCOSE 1H P 50 G GLC PO SERPL-MCNC: 96 MG/DL (ref 70–139)
HCT VFR BLD AUTO: 43.2 % (ref 37–47)
HGB BLD-MCNC: 14.5 G/DL (ref 12–16)
MCH RBC QN AUTO: 31.8 PG (ref 27–33)
MCHC RBC AUTO-ENTMCNC: 33.6 G/DL (ref 32.2–35.5)
MCV RBC AUTO: 94.7 FL (ref 81.4–97.8)
PLATELET # BLD AUTO: 259 K/UL (ref 164–446)
PMV BLD AUTO: 9.1 FL (ref 9–12.9)
RBC # BLD AUTO: 4.56 M/UL (ref 4.2–5.4)
T PALLIDUM AB SER QL IA: NONREACTIVE
WBC # BLD AUTO: 12.2 K/UL (ref 4.8–10.8)

## 2024-10-09 PROCEDURE — 85027 COMPLETE CBC AUTOMATED: CPT

## 2024-10-09 PROCEDURE — 86780 TREPONEMA PALLIDUM: CPT

## 2024-10-09 PROCEDURE — 36415 COLL VENOUS BLD VENIPUNCTURE: CPT

## 2024-10-09 PROCEDURE — 82950 GLUCOSE TEST: CPT

## 2024-10-18 ENCOUNTER — ROUTINE PRENATAL (OUTPATIENT)
Dept: OBGYN | Facility: CLINIC | Age: 24
End: 2024-10-18
Payer: COMMERCIAL

## 2024-10-18 VITALS — SYSTOLIC BLOOD PRESSURE: 105 MMHG | DIASTOLIC BLOOD PRESSURE: 74 MMHG | WEIGHT: 154 LBS | BODY MASS INDEX: 25.63 KG/M2

## 2024-10-18 DIAGNOSIS — Z34.03 ENCOUNTER FOR PRENATAL CARE IN THIRD TRIMESTER OF FIRST PREGNANCY: ICD-10-CM

## 2024-10-18 PROBLEM — Z34.00 PREGNANCY, SUPERVISION OF FIRST: Status: ACTIVE | Noted: 2024-10-18

## 2024-10-18 PROBLEM — Z86.59 HISTORY OF DEPRESSION: Status: ACTIVE | Noted: 2024-10-18

## 2024-10-18 PROCEDURE — 0502F SUBSEQUENT PRENATAL CARE: CPT | Performed by: NURSE PRACTITIONER

## 2024-10-18 PROCEDURE — 3074F SYST BP LT 130 MM HG: CPT | Performed by: NURSE PRACTITIONER

## 2024-10-18 PROCEDURE — 3078F DIAST BP <80 MM HG: CPT | Performed by: NURSE PRACTITIONER

## 2024-10-18 ASSESSMENT — FIBROSIS 4 INDEX: FIB4 SCORE: 0.49

## 2024-10-22 ENCOUNTER — TELEPHONE (OUTPATIENT)
Dept: OBGYN | Facility: CLINIC | Age: 24
End: 2024-10-22
Payer: COMMERCIAL

## 2024-10-30 ENCOUNTER — ROUTINE PRENATAL (OUTPATIENT)
Dept: OBGYN | Facility: CLINIC | Age: 24
End: 2024-10-30
Payer: COMMERCIAL

## 2024-10-30 VITALS
WEIGHT: 154.4 LBS | DIASTOLIC BLOOD PRESSURE: 82 MMHG | SYSTOLIC BLOOD PRESSURE: 116 MMHG | HEART RATE: 100 BPM | BODY MASS INDEX: 25.69 KG/M2

## 2024-10-30 DIAGNOSIS — Z34.90 PREGNANCY, UNSPECIFIED GESTATIONAL AGE: ICD-10-CM

## 2024-10-30 ASSESSMENT — FIBROSIS 4 INDEX: FIB4 SCORE: 0.49

## 2024-11-13 ENCOUNTER — NON-PROVIDER VISIT (OUTPATIENT)
Dept: MATERNAL FETAL MEDICINE | Facility: MEDICAL CENTER | Age: 24
End: 2024-11-13
Payer: COMMERCIAL

## 2024-11-13 VITALS
BODY MASS INDEX: 25.99 KG/M2 | SYSTOLIC BLOOD PRESSURE: 104 MMHG | WEIGHT: 156.2 LBS | DIASTOLIC BLOOD PRESSURE: 75 MMHG | HEART RATE: 95 BPM

## 2024-11-13 DIAGNOSIS — O35.9XX0 KNOWN FETAL ANOMALY, ANTEPARTUM, NOT APPLICABLE OR UNSPECIFIED FETUS: ICD-10-CM

## 2024-11-13 DIAGNOSIS — O35.9XX0 KNOWN FETAL ANOMALY, ANTEPARTUM, SINGLE OR UNSPECIFIED FETUS: ICD-10-CM

## 2024-11-13 DIAGNOSIS — Z87.730 HISTORY OF CLEFT PALATE: ICD-10-CM

## 2024-11-13 DIAGNOSIS — O09.92 SUPERVISION OF HIGH RISK PREGNANCY, UNSPECIFIED, SECOND TRIMESTER: ICD-10-CM

## 2024-11-13 DIAGNOSIS — Q23.81 BICUSPID AORTIC VALVE: ICD-10-CM

## 2024-11-13 DIAGNOSIS — O36.5930 POOR FETAL GROWTH AFFECTING MANAGEMENT OF MOTHER IN THIRD TRIMESTER, SINGLE OR UNSPECIFIED FETUS: ICD-10-CM

## 2024-11-13 DIAGNOSIS — Z3A.33 33 WEEKS GESTATION OF PREGNANCY: ICD-10-CM

## 2024-11-13 DIAGNOSIS — Z86.59 HISTORY OF DEPRESSION: ICD-10-CM

## 2024-11-13 PROCEDURE — 76816 OB US FOLLOW-UP PER FETUS: CPT | Performed by: OBSTETRICS & GYNECOLOGY

## 2024-11-13 PROCEDURE — 59025 FETAL NON-STRESS TEST: CPT | Performed by: OBSTETRICS & GYNECOLOGY

## 2024-11-13 PROCEDURE — 99213 OFFICE O/P EST LOW 20 MIN: CPT | Performed by: OBSTETRICS & GYNECOLOGY

## 2024-11-13 PROCEDURE — 76820 UMBILICAL ARTERY ECHO: CPT | Performed by: OBSTETRICS & GYNECOLOGY

## 2024-11-13 ASSESSMENT — FIBROSIS 4 INDEX: FIB4 SCORE: 0.49

## 2024-11-19 ENCOUNTER — ROUTINE PRENATAL (OUTPATIENT)
Dept: OBGYN | Facility: CLINIC | Age: 24
End: 2024-11-19
Payer: COMMERCIAL

## 2024-11-19 VITALS — SYSTOLIC BLOOD PRESSURE: 122 MMHG | BODY MASS INDEX: 26.59 KG/M2 | DIASTOLIC BLOOD PRESSURE: 84 MMHG | WEIGHT: 159.8 LBS

## 2024-11-19 DIAGNOSIS — Z34.03 ENCOUNTER FOR PRENATAL CARE IN THIRD TRIMESTER OF FIRST PREGNANCY: ICD-10-CM

## 2024-11-19 DIAGNOSIS — Z29.11 ENCOUNTER FOR PROPHYLACTIC IMMUNOTHERAPY FOR RESPIRATORY SYNCYTIAL VIRUS (RSV): ICD-10-CM

## 2024-11-19 DIAGNOSIS — O36.5930 POOR FETAL GROWTH AFFECTING MANAGEMENT OF MOTHER IN THIRD TRIMESTER, SINGLE OR UNSPECIFIED FETUS: ICD-10-CM

## 2024-11-19 PROCEDURE — 3079F DIAST BP 80-89 MM HG: CPT | Performed by: OBSTETRICS & GYNECOLOGY

## 2024-11-19 PROCEDURE — 3074F SYST BP LT 130 MM HG: CPT | Performed by: OBSTETRICS & GYNECOLOGY

## 2024-11-19 PROCEDURE — 0502F SUBSEQUENT PRENATAL CARE: CPT | Performed by: OBSTETRICS & GYNECOLOGY

## 2024-11-19 ASSESSMENT — FIBROSIS 4 INDEX: FIB4 SCORE: 0.49

## 2024-11-19 NOTE — PROGRESS NOTES
OB Visit Note - 33w6d     MEDICAL DECISION MAKING:  Alessandra Arita is a 24 y.o. female  at 33w6d    Today's visit addressed:   Doing well. No CTX, VB or LOF. Good FM. Doing fetal kick counts twice weekly as recommended since being diagnosed with IUGR.    Pregnancy complicated by:  Patient Active Problem List   Diagnosis    Bicuspid aortic valve    History of cleft palate    Rh negative status during pregnancy in second trimester    History of depression and anxiety    Supervision of first pregnancy   IUGR - has NST/ALIZA tomorrow with MFM. Continue kick counts. Reviewed delivery by 38 weeks if no indication to deliver before then. Fetal lacrimal duct cyst also noted, will need to be evaluated postnatally. Patient planning to eventually deliver in [place], discussed will need to eventually request medical records to be sent. We will assist her in any way with this.     RSV vaccine offered, accepts. Epic system went down so administration of vaccine could not be done today. Will plan to administer next visit.     The patient will follow up in 2 week(s). She was counseled to call or return for vaginal bleeding, regular contractions, leakage of fluid or decreased fetal movement. BID kick counts per MFM.     Rojelio Cormier M.D.

## 2024-11-19 NOTE — PROGRESS NOTES
Pt here today for OB follow up  Reports +FM  WT: 159.8 lb  BP: 122/84  Preferred pharmacy verified with pt.  MFM Appointment: Saw Dr Townsend on 11/13/2024, next visit 11/20/2024  Pt states no complaints or concerns today   Pt declines BTL  Good # [phone]    Unable to given RSV vaccine to pt today Epic went down. Unable to scan vaccine in system.

## 2024-11-20 ENCOUNTER — NON-PROVIDER VISIT (OUTPATIENT)
Dept: MATERNAL FETAL MEDICINE | Facility: MEDICAL CENTER | Age: 24
End: 2024-11-20
Payer: COMMERCIAL

## 2024-11-20 ENCOUNTER — ANCILLARY PROCEDURE (OUTPATIENT)
Dept: MATERNAL FETAL MEDICINE | Facility: MEDICAL CENTER | Age: 24
End: 2024-11-20
Attending: OBSTETRICS & GYNECOLOGY
Payer: COMMERCIAL

## 2024-11-20 VITALS
BODY MASS INDEX: 26.33 KG/M2 | SYSTOLIC BLOOD PRESSURE: 112 MMHG | DIASTOLIC BLOOD PRESSURE: 89 MMHG | WEIGHT: 158.2 LBS | HEART RATE: 60 BPM

## 2024-11-20 DIAGNOSIS — O35.9XX0 KNOWN FETAL ANOMALY, ANTEPARTUM, NOT APPLICABLE OR UNSPECIFIED FETUS: ICD-10-CM

## 2024-11-20 DIAGNOSIS — Z87.730 HISTORY OF CLEFT PALATE: ICD-10-CM

## 2024-11-20 DIAGNOSIS — O36.5930 POOR FETAL GROWTH AFFECTING MANAGEMENT OF MOTHER IN THIRD TRIMESTER, SINGLE OR UNSPECIFIED FETUS: ICD-10-CM

## 2024-11-20 DIAGNOSIS — Z86.59 HISTORY OF DEPRESSION: ICD-10-CM

## 2024-11-20 DIAGNOSIS — Z3A.34 34 WEEKS GESTATION OF PREGNANCY: ICD-10-CM

## 2024-11-20 DIAGNOSIS — O09.92 SUPERVISION OF HIGH RISK PREGNANCY, UNSPECIFIED, SECOND TRIMESTER: ICD-10-CM

## 2024-11-20 DIAGNOSIS — Q23.81 BICUSPID AORTIC VALVE: ICD-10-CM

## 2024-11-20 PROCEDURE — 59025 FETAL NON-STRESS TEST: CPT | Performed by: OBSTETRICS & GYNECOLOGY

## 2024-11-20 PROCEDURE — 76815 OB US LIMITED FETUS(S): CPT | Performed by: OBSTETRICS & GYNECOLOGY

## 2024-11-20 PROCEDURE — 76820 UMBILICAL ARTERY ECHO: CPT | Performed by: OBSTETRICS & GYNECOLOGY

## 2024-11-20 ASSESSMENT — FIBROSIS 4 INDEX: FIB4 SCORE: 0.49

## 2024-11-27 ENCOUNTER — APPOINTMENT (OUTPATIENT)
Dept: MATERNAL FETAL MEDICINE | Facility: MEDICAL CENTER | Age: 24
End: 2024-11-27
Payer: COMMERCIAL

## 2024-12-03 ENCOUNTER — APPOINTMENT (OUTPATIENT)
Dept: MATERNAL FETAL MEDICINE | Facility: MEDICAL CENTER | Age: 24
End: 2024-12-03
Payer: COMMERCIAL

## 2024-12-03 ENCOUNTER — HOSPITAL ENCOUNTER (OUTPATIENT)
Facility: MEDICAL CENTER | Age: 24
End: 2024-12-03
Attending: OBSTETRICS & GYNECOLOGY
Payer: COMMERCIAL

## 2024-12-03 VITALS
HEART RATE: 103 BPM | BODY MASS INDEX: 26.69 KG/M2 | WEIGHT: 160.4 LBS | SYSTOLIC BLOOD PRESSURE: 121 MMHG | DIASTOLIC BLOOD PRESSURE: 79 MMHG

## 2024-12-03 DIAGNOSIS — O09.92 SUPERVISION OF HIGH RISK PREGNANCY, UNSPECIFIED, SECOND TRIMESTER: ICD-10-CM

## 2024-12-03 DIAGNOSIS — Z87.730 HISTORY OF CLEFT PALATE: ICD-10-CM

## 2024-12-03 DIAGNOSIS — O36.5930 POOR FETAL GROWTH AFFECTING MANAGEMENT OF MOTHER IN THIRD TRIMESTER, SINGLE OR UNSPECIFIED FETUS: ICD-10-CM

## 2024-12-03 DIAGNOSIS — Z86.59 HISTORY OF DEPRESSION: ICD-10-CM

## 2024-12-03 DIAGNOSIS — Q23.81 BICUSPID AORTIC VALVE: ICD-10-CM

## 2024-12-03 DIAGNOSIS — Z3A.35 35 WEEKS GESTATION OF PREGNANCY: ICD-10-CM

## 2024-12-03 PROCEDURE — 59025 FETAL NON-STRESS TEST: CPT | Performed by: OBSTETRICS & GYNECOLOGY

## 2024-12-03 PROCEDURE — 87150 DNA/RNA AMPLIFIED PROBE: CPT

## 2024-12-03 PROCEDURE — 76815 OB US LIMITED FETUS(S): CPT | Performed by: OBSTETRICS & GYNECOLOGY

## 2024-12-03 PROCEDURE — 76820 UMBILICAL ARTERY ECHO: CPT | Performed by: OBSTETRICS & GYNECOLOGY

## 2024-12-03 PROCEDURE — 87081 CULTURE SCREEN ONLY: CPT

## 2024-12-03 ASSESSMENT — FIBROSIS 4 INDEX: FIB4 SCORE: 0.49

## 2024-12-04 ENCOUNTER — ROUTINE PRENATAL (OUTPATIENT)
Dept: OBGYN | Facility: CLINIC | Age: 24
End: 2024-12-04
Payer: COMMERCIAL

## 2024-12-04 VITALS — WEIGHT: 161.2 LBS | SYSTOLIC BLOOD PRESSURE: 120 MMHG | BODY MASS INDEX: 26.83 KG/M2 | DIASTOLIC BLOOD PRESSURE: 79 MMHG

## 2024-12-04 DIAGNOSIS — Z34.03 ENCOUNTER FOR PRENATAL CARE IN THIRD TRIMESTER OF FIRST PREGNANCY: ICD-10-CM

## 2024-12-04 DIAGNOSIS — O36.5930 POOR FETAL GROWTH AFFECTING MANAGEMENT OF MOTHER IN THIRD TRIMESTER, SINGLE OR UNSPECIFIED FETUS: ICD-10-CM

## 2024-12-04 PROCEDURE — 3074F SYST BP LT 130 MM HG: CPT | Performed by: OBSTETRICS & GYNECOLOGY

## 2024-12-04 PROCEDURE — 0502F SUBSEQUENT PRENATAL CARE: CPT | Performed by: OBSTETRICS & GYNECOLOGY

## 2024-12-04 PROCEDURE — 3078F DIAST BP <80 MM HG: CPT | Performed by: OBSTETRICS & GYNECOLOGY

## 2024-12-04 ASSESSMENT — FIBROSIS 4 INDEX: FIB4 SCORE: 0.49

## 2024-12-04 NOTE — PROGRESS NOTES
Pt here today for OB follow up  Reports +FM  WT: 161.2 lb  BP: 120/79  Preferred pharmacy verified with pt.  MFM Appointment: Saw Dr Townsend for NST, ALIZA Dopplers. Next visit on 12/10/2024  Good # [phone]  Pt states no complaints or concerns today   GBS to be done today

## 2024-12-04 NOTE — PROGRESS NOTES
OB Visit Note - 36w0d     MEDICAL DECISION MAKING:  Alessandra Arita is a 24 y.o. female  at 36w0d    Today's visit addressed:   Doing well. Some pelvic pressure but no CTX, VB or LOF. Good FM.     Pregnancy complicated by:  Patient Active Problem List   Diagnosis    Bicuspid aortic valve    History of cleft palate    Rh negative status during pregnancy in second trimester    History of depression and anxiety    Supervision of first pregnancy    Poor fetal growth affecting management of mother in third trimester   IUGR - had NST and dopplers yesterday. Has repeat growth US 12/10 to determine when delivery recommended per MFM.     GBS collected today.     Office out of RSV vaccination supply, patient will go to local pharmacy to obtain today or tomorrow.     The patient will follow up in 1 week(s). She was counseled to call or return for vaginal bleeding, regular contractions, leakage of fluid or decreased fetal movement. Daily kick counts.     Rojelio Cormier M.D.

## 2024-12-06 LAB — GP B STREP DNA SPEC QL NAA+PROBE: POSITIVE

## 2024-12-10 ENCOUNTER — ROUTINE PRENATAL (OUTPATIENT)
Dept: OBGYN | Facility: CLINIC | Age: 24
End: 2024-12-10
Payer: COMMERCIAL

## 2024-12-10 ENCOUNTER — APPOINTMENT (OUTPATIENT)
Dept: MATERNAL FETAL MEDICINE | Facility: MEDICAL CENTER | Age: 24
End: 2024-12-10
Payer: COMMERCIAL

## 2024-12-10 VITALS
DIASTOLIC BLOOD PRESSURE: 76 MMHG | WEIGHT: 162.9 LBS | HEART RATE: 84 BPM | BODY MASS INDEX: 27.11 KG/M2 | SYSTOLIC BLOOD PRESSURE: 105 MMHG

## 2024-12-10 VITALS — BODY MASS INDEX: 27.36 KG/M2 | WEIGHT: 164.4 LBS | DIASTOLIC BLOOD PRESSURE: 80 MMHG | SYSTOLIC BLOOD PRESSURE: 107 MMHG

## 2024-12-10 DIAGNOSIS — Q24.9 MATERNAL CONGENITAL HEART DISEASE, ANTEPARTUM, THIRD TRIMESTER: ICD-10-CM

## 2024-12-10 DIAGNOSIS — O99.413 MATERNAL CONGENITAL HEART DISEASE, ANTEPARTUM, THIRD TRIMESTER: ICD-10-CM

## 2024-12-10 DIAGNOSIS — Z86.59 HISTORY OF DEPRESSION: ICD-10-CM

## 2024-12-10 DIAGNOSIS — Z3A.38 38 WEEKS GESTATION OF PREGNANCY: ICD-10-CM

## 2024-12-10 DIAGNOSIS — Z87.730 HISTORY OF CLEFT PALATE: ICD-10-CM

## 2024-12-10 DIAGNOSIS — O36.5930 POOR FETAL GROWTH AFFECTING MANAGEMENT OF MOTHER IN THIRD TRIMESTER, SINGLE OR UNSPECIFIED FETUS: ICD-10-CM

## 2024-12-10 DIAGNOSIS — Z82.79 FAMILY HISTORY OF CLEFT PALATE WITH CLEFT LIP: ICD-10-CM

## 2024-12-10 DIAGNOSIS — O28.3 ABNORMAL FETAL ULTRASOUND: ICD-10-CM

## 2024-12-10 DIAGNOSIS — O09.92 SUPERVISION OF HIGH RISK PREGNANCY, UNSPECIFIED, SECOND TRIMESTER: ICD-10-CM

## 2024-12-10 DIAGNOSIS — B95.1 POSITIVE GBS TEST: ICD-10-CM

## 2024-12-10 DIAGNOSIS — Z34.03 ENCOUNTER FOR PRENATAL CARE IN THIRD TRIMESTER OF FIRST PREGNANCY: ICD-10-CM

## 2024-12-10 DIAGNOSIS — Q23.81 BICUSPID AORTIC VALVE: ICD-10-CM

## 2024-12-10 PROCEDURE — 0502F SUBSEQUENT PRENATAL CARE: CPT | Performed by: OBSTETRICS & GYNECOLOGY

## 2024-12-10 PROCEDURE — 3074F SYST BP LT 130 MM HG: CPT | Performed by: OBSTETRICS & GYNECOLOGY

## 2024-12-10 PROCEDURE — 3079F DIAST BP 80-89 MM HG: CPT | Performed by: OBSTETRICS & GYNECOLOGY

## 2024-12-10 PROCEDURE — 76820 UMBILICAL ARTERY ECHO: CPT | Performed by: OBSTETRICS & GYNECOLOGY

## 2024-12-10 PROCEDURE — 76818 FETAL BIOPHYS PROFILE W/NST: CPT | Performed by: OBSTETRICS & GYNECOLOGY

## 2024-12-10 PROCEDURE — 76816 OB US FOLLOW-UP PER FETUS: CPT | Performed by: OBSTETRICS & GYNECOLOGY

## 2024-12-10 ASSESSMENT — FIBROSIS 4 INDEX
FIB4 SCORE: 0.49
FIB4 SCORE: 0.49

## 2024-12-10 NOTE — PROGRESS NOTES
Pt here today for OB follow up  Reports +FM  WT:   BP:   Preferred pharmacy verified with pt.  MFM Appointment: Next visit today, 12/10/24 @ 2:00pm  Good # [phone]  Pt states no complaints or concerns today  GBS+ - resulted 12/4/24

## 2024-12-10 NOTE — PROGRESS NOTES
OB Visit Note - 36w6d     MEDICAL DECISION MAKING:  Alessandra Arita is a 24 y.o. female  at 36w6d    Today's visit addressed:   Doing well. No CTX, VB or LOF. Good FM.     Pregnancy complicated by:  Patient Active Problem List   Diagnosis    Bicuspid aortic valve    History of cleft palate    Rh negative status during pregnancy in second trimester    History of depression and anxiety    Supervision of first pregnancy    Poor fetal growth affecting management of mother in third trimester    Positive GBS test   IUGR - has f/u growth US today and recommendations for delivery timing.  Continue  testing.     GBS positive discussed.     The patient will follow up in 1 week(s). She was counseled to call or return for vaginal bleeding, regular contractions, leakage of fluid or decreased fetal movement. Continue daily kick counts.     Rojelio Cormier M.D.

## 2024-12-10 NOTE — PROGRESS NOTES
Pt here today for OB follow up  Reports +FM  WT: 164.4 lb  BP: 107/80  Preferred pharmacy verified with pt.  MFM Appointment: Saw Dr Alejo on 12/03/24. Has appt today for ALIZA/Dopplers, NST and growth Scan.  Pt states received the  RSV vaccine at [facility] on 12/05/2024  Pt states no complaints or concerns today  Good # [phone]

## 2024-12-16 ENCOUNTER — APPOINTMENT (OUTPATIENT)
Dept: OBGYN | Facility: CLINIC | Age: 24
End: 2024-12-16
Payer: COMMERCIAL

## 2024-12-18 ENCOUNTER — ROUTINE PRENATAL (OUTPATIENT)
Dept: OBGYN | Facility: CLINIC | Age: 24
End: 2024-12-18
Payer: COMMERCIAL

## 2024-12-18 VITALS — BODY MASS INDEX: 27.41 KG/M2 | WEIGHT: 164.7 LBS | DIASTOLIC BLOOD PRESSURE: 81 MMHG | SYSTOLIC BLOOD PRESSURE: 121 MMHG

## 2024-12-18 DIAGNOSIS — Z3A.38 38 WEEKS GESTATION OF PREGNANCY: ICD-10-CM

## 2024-12-18 DIAGNOSIS — O09.93 HIGH-RISK PREGNANCY IN THIRD TRIMESTER: ICD-10-CM

## 2024-12-18 DIAGNOSIS — O36.5930 POOR FETAL GROWTH AFFECTING MANAGEMENT OF MOTHER IN THIRD TRIMESTER, SINGLE OR UNSPECIFIED FETUS: ICD-10-CM

## 2024-12-18 PROCEDURE — 59402 PR NEW OB HIGH RISK: CPT | Performed by: OBSTETRICS & GYNECOLOGY

## 2024-12-18 PROCEDURE — 3074F SYST BP LT 130 MM HG: CPT | Performed by: OBSTETRICS & GYNECOLOGY

## 2024-12-18 PROCEDURE — 99213 OFFICE O/P EST LOW 20 MIN: CPT | Performed by: OBSTETRICS & GYNECOLOGY

## 2024-12-18 PROCEDURE — 3079F DIAST BP 80-89 MM HG: CPT | Performed by: OBSTETRICS & GYNECOLOGY

## 2024-12-18 ASSESSMENT — FIBROSIS 4 INDEX: FIB4 SCORE: 0.49

## 2024-12-18 NOTE — PROGRESS NOTES
OB Visit Note - 38w0d     MEDICAL DECISION MAKING:  Alessandra Arita is a 24 y.o. female  at 38w0d complicated by history of fetal growth restriction with an AC of 7 to 9th percentile over the last month.  The patient reports good fetal movement, no vaginal bleeding, occasionally uterine contractions.  She denies vaginal bleeding or discharge.  She wishes to discuss scheduling induction.    Today's visit addressed:   1.  Fetal growth restriction which is relatively stable at 9th percentile with the previous visit.  The patient does have some concerns regarding traveling not having family nearby and would like to schedule an induction.    Pregnancy complicated by:  Patient Active Problem List   Diagnosis    Bicuspid aortic valve    History of cleft palate    Rh negative status during pregnancy in second trimester    History of depression and anxiety    Supervision of first pregnancy    Poor fetal growth affecting management of mother in third trimester    Positive GBS test       The patient will follow up with her scheduled NST and fetal Doppler study this Friday.  She has an appointment with maternal-fetal medicine to confirm her suggested delivery date based on the stable abdominal circumference.  She will be scheduled for Monday her preference at 38 and 5 however should she have recommendations to deliver after 39 weeks, we will reschedule.  She does have a reasonably favorable cervix but she does understand that this may take more than 1 day for a successful delivery.  She was counseled to call or return for vaginal bleeding, regular contractions, leakage of fluid or decreased fetal movement.    Eric Alegre M.D.

## 2024-12-18 NOTE — PROGRESS NOTES
Pt. Here for OB/FU.   38w0d  Reports Good FM.    Pt. Denies VB, LOF, or UC's.     Pt states she has been having BH. She wanted to ask about IOL and travel.  Next NST with MFM 12/20.  Pt desires a cervical check.    Phone/Pharm verified.   [phone]

## 2024-12-20 ENCOUNTER — NON-PROVIDER VISIT (OUTPATIENT)
Dept: MATERNAL FETAL MEDICINE | Facility: MEDICAL CENTER | Age: 24
End: 2024-12-20
Payer: COMMERCIAL

## 2024-12-20 VITALS
SYSTOLIC BLOOD PRESSURE: 105 MMHG | WEIGHT: 163 LBS | DIASTOLIC BLOOD PRESSURE: 82 MMHG | HEART RATE: 94 BPM | BODY MASS INDEX: 27.12 KG/M2

## 2024-12-20 DIAGNOSIS — Z86.59 HISTORY OF DEPRESSION: ICD-10-CM

## 2024-12-20 DIAGNOSIS — Z3A.38 38 WEEKS GESTATION OF PREGNANCY: ICD-10-CM

## 2024-12-20 DIAGNOSIS — Z87.730 HISTORY OF CLEFT PALATE: ICD-10-CM

## 2024-12-20 DIAGNOSIS — Q23.81 BICUSPID AORTIC VALVE: ICD-10-CM

## 2024-12-20 DIAGNOSIS — O36.5910 IUGR (INTRAUTERINE GROWTH RESTRICTION) AFFECTING CARE OF MOTHER, FIRST TRIMESTER, NOT APPLICABLE OR UNSPECIFIED FETUS: ICD-10-CM

## 2024-12-20 DIAGNOSIS — Z82.79 FAMILY HISTORY OF CLEFT LIP AND PALATE: ICD-10-CM

## 2024-12-20 DIAGNOSIS — Z34.03 ENCOUNTER FOR PRENATAL CARE IN THIRD TRIMESTER OF FIRST PREGNANCY: ICD-10-CM

## 2024-12-20 DIAGNOSIS — O99.413 MATERNAL CONGENITAL HEART DISEASE IN THIRD TRIMESTER, ANTEPARTUM: ICD-10-CM

## 2024-12-20 DIAGNOSIS — O36.5930 POOR FETAL GROWTH AFFECTING MANAGEMENT OF MOTHER IN THIRD TRIMESTER, SINGLE OR UNSPECIFIED FETUS: ICD-10-CM

## 2024-12-20 DIAGNOSIS — Q24.9 MATERNAL CONGENITAL HEART DISEASE IN THIRD TRIMESTER, ANTEPARTUM: ICD-10-CM

## 2024-12-20 DIAGNOSIS — O09.92 SUPERVISION OF HIGH RISK PREGNANCY, UNSPECIFIED, SECOND TRIMESTER: ICD-10-CM

## 2024-12-20 PROCEDURE — 76820 UMBILICAL ARTERY ECHO: CPT | Performed by: OBSTETRICS & GYNECOLOGY

## 2024-12-20 PROCEDURE — 76815 OB US LIMITED FETUS(S): CPT | Performed by: OBSTETRICS & GYNECOLOGY

## 2024-12-20 PROCEDURE — 59025 FETAL NON-STRESS TEST: CPT | Performed by: OBSTETRICS & GYNECOLOGY

## 2024-12-20 ASSESSMENT — FIBROSIS 4 INDEX: FIB4 SCORE: 0.49

## 2024-12-23 ENCOUNTER — ANESTHESIA EVENT (OUTPATIENT)
Dept: ANESTHESIOLOGY | Facility: MEDICAL CENTER | Age: 24
End: 2024-12-23
Payer: COMMERCIAL

## 2024-12-23 ENCOUNTER — ANESTHESIA (OUTPATIENT)
Dept: ANESTHESIOLOGY | Facility: MEDICAL CENTER | Age: 24
End: 2024-12-23
Payer: COMMERCIAL

## 2024-12-23 ENCOUNTER — APPOINTMENT (OUTPATIENT)
Dept: OBGYN | Facility: MEDICAL CENTER | Age: 24
End: 2024-12-23
Attending: STUDENT IN AN ORGANIZED HEALTH CARE EDUCATION/TRAINING PROGRAM
Payer: COMMERCIAL

## 2024-12-23 ENCOUNTER — APPOINTMENT (OUTPATIENT)
Dept: MATERNAL FETAL MEDICINE | Facility: MEDICAL CENTER | Age: 24
End: 2024-12-23
Payer: COMMERCIAL

## 2024-12-23 ENCOUNTER — HOSPITAL ENCOUNTER (INPATIENT)
Facility: MEDICAL CENTER | Age: 24
LOS: 2 days | End: 2024-12-25
Attending: STUDENT IN AN ORGANIZED HEALTH CARE EDUCATION/TRAINING PROGRAM | Admitting: STUDENT IN AN ORGANIZED HEALTH CARE EDUCATION/TRAINING PROGRAM
Payer: COMMERCIAL

## 2024-12-23 DIAGNOSIS — Z86.59 HISTORY OF DEPRESSION: ICD-10-CM

## 2024-12-23 DIAGNOSIS — Z34.03 ENCOUNTER FOR PRENATAL CARE IN THIRD TRIMESTER OF FIRST PREGNANCY: ICD-10-CM

## 2024-12-23 LAB
BASOPHILS # BLD AUTO: 0.3 % (ref 0–1.8)
BASOPHILS # BLD: 0.03 K/UL (ref 0–0.12)
EOSINOPHIL # BLD AUTO: 0.02 K/UL (ref 0–0.51)
EOSINOPHIL NFR BLD: 0.2 % (ref 0–6.9)
ERYTHROCYTE [DISTWIDTH] IN BLOOD BY AUTOMATED COUNT: 45.6 FL (ref 35.9–50)
HCT VFR BLD AUTO: 44.4 % (ref 37–47)
HGB BLD-MCNC: 15.3 G/DL (ref 12–16)
HOLDING TUBE BB 8507: NORMAL
IMM GRANULOCYTES # BLD AUTO: 0.07 K/UL (ref 0–0.11)
IMM GRANULOCYTES NFR BLD AUTO: 0.7 % (ref 0–0.9)
LYMPHOCYTES # BLD AUTO: 2.02 K/UL (ref 1–4.8)
LYMPHOCYTES NFR BLD: 20.4 % (ref 22–41)
MCH RBC QN AUTO: 31.2 PG (ref 27–33)
MCHC RBC AUTO-ENTMCNC: 34.5 G/DL (ref 32.2–35.5)
MCV RBC AUTO: 90.6 FL (ref 81.4–97.8)
MONOCYTES # BLD AUTO: 0.78 K/UL (ref 0–0.85)
MONOCYTES NFR BLD AUTO: 7.9 % (ref 0–13.4)
NEUTROPHILS # BLD AUTO: 6.96 K/UL (ref 1.82–7.42)
NEUTROPHILS NFR BLD: 70.5 % (ref 44–72)
NRBC # BLD AUTO: 0 K/UL
NRBC BLD-RTO: 0 /100 WBC (ref 0–0.2)
PLATELET # BLD AUTO: 286 K/UL (ref 164–446)
PMV BLD AUTO: 9.5 FL (ref 9–12.9)
RBC # BLD AUTO: 4.9 M/UL (ref 4.2–5.4)
T PALLIDUM AB SER QL IA: NONREACTIVE
WBC # BLD AUTO: 9.9 K/UL (ref 4.8–10.8)

## 2024-12-23 PROCEDURE — 303615 HCHG EPIDURAL/SPINAL ANESTHESIA FOR LABOR

## 2024-12-23 PROCEDURE — 700101 HCHG RX REV CODE 250: Performed by: STUDENT IN AN ORGANIZED HEALTH CARE EDUCATION/TRAINING PROGRAM

## 2024-12-23 PROCEDURE — 700111 HCHG RX REV CODE 636 W/ 250 OVERRIDE (IP): Performed by: STUDENT IN AN ORGANIZED HEALTH CARE EDUCATION/TRAINING PROGRAM

## 2024-12-23 PROCEDURE — 86780 TREPONEMA PALLIDUM: CPT

## 2024-12-23 PROCEDURE — 36415 COLL VENOUS BLD VENIPUNCTURE: CPT

## 2024-12-23 PROCEDURE — 700105 HCHG RX REV CODE 258: Performed by: STUDENT IN AN ORGANIZED HEALTH CARE EDUCATION/TRAINING PROGRAM

## 2024-12-23 PROCEDURE — 770002 HCHG ROOM/CARE - OB PRIVATE (112)

## 2024-12-23 PROCEDURE — 700111 HCHG RX REV CODE 636 W/ 250 OVERRIDE (IP): Mod: JZ | Performed by: STUDENT IN AN ORGANIZED HEALTH CARE EDUCATION/TRAINING PROGRAM

## 2024-12-23 PROCEDURE — 85025 COMPLETE CBC W/AUTO DIFF WBC: CPT

## 2024-12-23 PROCEDURE — 3E033VJ INTRODUCTION OF OTHER HORMONE INTO PERIPHERAL VEIN, PERCUTANEOUS APPROACH: ICD-10-PCS | Performed by: STUDENT IN AN ORGANIZED HEALTH CARE EDUCATION/TRAINING PROGRAM

## 2024-12-23 RX ORDER — CARBOPROST TROMETHAMINE 250 UG/ML
250 INJECTION, SOLUTION INTRAMUSCULAR
Status: DISCONTINUED | OUTPATIENT
Start: 2024-12-23 | End: 2024-12-24

## 2024-12-23 RX ORDER — LIDOCAINE HYDROCHLORIDE AND EPINEPHRINE 15; 5 MG/ML; UG/ML
INJECTION, SOLUTION EPIDURAL
Status: COMPLETED | OUTPATIENT
Start: 2024-12-23 | End: 2024-12-23

## 2024-12-23 RX ORDER — ACETAMINOPHEN 500 MG
1000 TABLET ORAL
Status: COMPLETED | OUTPATIENT
Start: 2024-12-23 | End: 2024-12-24

## 2024-12-23 RX ORDER — SODIUM CHLORIDE, SODIUM LACTATE, POTASSIUM CHLORIDE, AND CALCIUM CHLORIDE .6; .31; .03; .02 G/100ML; G/100ML; G/100ML; G/100ML
250 INJECTION, SOLUTION INTRAVENOUS PRN
Status: DISCONTINUED | OUTPATIENT
Start: 2024-12-23 | End: 2024-12-24

## 2024-12-23 RX ORDER — HYDROXYZINE HYDROCHLORIDE 50 MG/1
50 TABLET, FILM COATED ORAL EVERY 6 HOURS PRN
Status: DISCONTINUED | OUTPATIENT
Start: 2024-12-23 | End: 2024-12-24

## 2024-12-23 RX ORDER — ALUMINA, MAGNESIA, AND SIMETHICONE 2400; 2400; 240 MG/30ML; MG/30ML; MG/30ML
30 SUSPENSION ORAL EVERY 6 HOURS PRN
Status: DISCONTINUED | OUTPATIENT
Start: 2024-12-23 | End: 2024-12-24

## 2024-12-23 RX ORDER — SODIUM CHLORIDE, SODIUM LACTATE, POTASSIUM CHLORIDE, AND CALCIUM CHLORIDE .6; .31; .03; .02 G/100ML; G/100ML; G/100ML; G/100ML
1000 INJECTION, SOLUTION INTRAVENOUS
Status: DISCONTINUED | OUTPATIENT
Start: 2024-12-23 | End: 2024-12-24

## 2024-12-23 RX ORDER — OXYTOCIN 10 [USP'U]/ML
10 INJECTION, SOLUTION INTRAMUSCULAR; INTRAVENOUS
Status: DISCONTINUED | OUTPATIENT
Start: 2024-12-23 | End: 2024-12-24

## 2024-12-23 RX ORDER — MISOPROSTOL 200 UG/1
800 TABLET ORAL
Status: DISCONTINUED | OUTPATIENT
Start: 2024-12-23 | End: 2024-12-24

## 2024-12-23 RX ORDER — EPHEDRINE SULFATE 50 MG/ML
5 INJECTION, SOLUTION INTRAVENOUS
Status: DISCONTINUED | OUTPATIENT
Start: 2024-12-23 | End: 2024-12-24

## 2024-12-23 RX ORDER — ONDANSETRON 4 MG/1
4 TABLET, ORALLY DISINTEGRATING ORAL EVERY 6 HOURS PRN
Status: DISCONTINUED | OUTPATIENT
Start: 2024-12-23 | End: 2024-12-24

## 2024-12-23 RX ORDER — ONDANSETRON 2 MG/ML
4 INJECTION INTRAMUSCULAR; INTRAVENOUS EVERY 6 HOURS PRN
Status: DISCONTINUED | OUTPATIENT
Start: 2024-12-23 | End: 2024-12-24

## 2024-12-23 RX ORDER — ROPIVACAINE HYDROCHLORIDE 2 MG/ML
INJECTION, SOLUTION EPIDURAL; INFILTRATION; PERINEURAL CONTINUOUS
Status: DISCONTINUED | OUTPATIENT
Start: 2024-12-23 | End: 2024-12-24

## 2024-12-23 RX ORDER — LIDOCAINE HYDROCHLORIDE 10 MG/ML
20 INJECTION, SOLUTION INFILTRATION; PERINEURAL
Status: DISCONTINUED | OUTPATIENT
Start: 2024-12-23 | End: 2024-12-24

## 2024-12-23 RX ORDER — TERBUTALINE SULFATE 1 MG/ML
0.25 INJECTION, SOLUTION SUBCUTANEOUS
Status: DISCONTINUED | OUTPATIENT
Start: 2024-12-23 | End: 2024-12-24

## 2024-12-23 RX ORDER — IBUPROFEN 800 MG/1
800 TABLET, FILM COATED ORAL
Status: COMPLETED | OUTPATIENT
Start: 2024-12-23 | End: 2024-12-24

## 2024-12-23 RX ORDER — METHYLERGONOVINE MALEATE 0.2 MG/ML
0.2 INJECTION INTRAVENOUS
Status: DISCONTINUED | OUTPATIENT
Start: 2024-12-23 | End: 2024-12-24

## 2024-12-23 RX ORDER — SODIUM CHLORIDE, SODIUM LACTATE, POTASSIUM CHLORIDE, CALCIUM CHLORIDE 600; 310; 30; 20 MG/100ML; MG/100ML; MG/100ML; MG/100ML
INJECTION, SOLUTION INTRAVENOUS CONTINUOUS
Status: DISCONTINUED | OUTPATIENT
Start: 2024-12-23 | End: 2024-12-24

## 2024-12-23 RX ORDER — ROPIVACAINE HYDROCHLORIDE 2 MG/ML
INJECTION, SOLUTION EPIDURAL; INFILTRATION
Status: COMPLETED | OUTPATIENT
Start: 2024-12-23 | End: 2024-12-23

## 2024-12-23 RX ORDER — BUPIVACAINE HYDROCHLORIDE 5 MG/ML
INJECTION, SOLUTION EPIDURAL; INTRACAUDAL PRN
Status: DISCONTINUED | OUTPATIENT
Start: 2024-12-23 | End: 2024-12-24 | Stop reason: SURG

## 2024-12-23 RX ADMIN — SODIUM CHLORIDE 2.5 MILLION UNITS: 9 INJECTION, SOLUTION INTRAVENOUS at 17:24

## 2024-12-23 RX ADMIN — ROPIVACAINE HYDROCHLORIDE 7 ML: 2 INJECTION EPIDURAL; INFILTRATION; PERINEURAL at 21:18

## 2024-12-23 RX ADMIN — OXYTOCIN 2 MILLI-UNITS/MIN: 10 INJECTION, SOLUTION INTRAMUSCULAR; INTRAVENOUS at 12:37

## 2024-12-23 RX ADMIN — SODIUM CHLORIDE, POTASSIUM CHLORIDE, SODIUM LACTATE AND CALCIUM CHLORIDE: 600; 310; 30; 20 INJECTION, SOLUTION INTRAVENOUS at 12:33

## 2024-12-23 RX ADMIN — LIDOCAINE HYDROCHLORIDE AND EPINEPHRINE 3 ML: 15; 5 INJECTION, SOLUTION EPIDURAL at 23:10

## 2024-12-23 RX ADMIN — LIDOCAINE HYDROCHLORIDE,EPINEPHRINE BITARTRATE 3 ML: 15; .005 INJECTION, SOLUTION EPIDURAL; INFILTRATION; INTRACAUDAL; PERINEURAL at 23:10

## 2024-12-23 RX ADMIN — BUPIVACAINE HYDROCHLORIDE 4 ML: 5 INJECTION, SOLUTION EPIDURAL; INTRACAUDAL at 22:25

## 2024-12-23 RX ADMIN — ROPIVACAINE HYDROCHLORIDE: 2 INJECTION, SOLUTION EPIDURAL; INFILTRATION at 21:18

## 2024-12-23 RX ADMIN — LIDOCAINE HYDROCHLORIDE,EPINEPHRINE BITARTRATE 3 ML: 15; .005 INJECTION, SOLUTION EPIDURAL; INFILTRATION; INTRACAUDAL; PERINEURAL at 21:15

## 2024-12-23 RX ADMIN — SODIUM CHLORIDE 5 MILLION UNITS: 900 INJECTION INTRAVENOUS at 12:35

## 2024-12-23 RX ADMIN — SODIUM CHLORIDE 2.5 MILLION UNITS: 9 INJECTION, SOLUTION INTRAVENOUS at 21:55

## 2024-12-23 SDOH — ECONOMIC STABILITY: FOOD INSECURITY: WITHIN THE PAST 12 MONTHS, YOU WORRIED THAT YOUR FOOD WOULD RUN OUT BEFORE YOU GOT MONEY TO BUY MORE.: NEVER TRUE

## 2024-12-23 SDOH — ECONOMIC STABILITY: TRANSPORTATION INSECURITY
IN THE PAST 12 MONTHS, HAS LACK OF TRANSPORTATION KEPT YOU FROM MEETINGS, WORK, OR FROM GETTING THINGS NEEDED FOR DAILY LIVING?: NO

## 2024-12-23 SDOH — ECONOMIC STABILITY: INCOME INSECURITY: IN THE LAST 12 MONTHS, WAS THERE A TIME WHEN YOU WERE NOT ABLE TO PAY THE MORTGAGE OR RENT ON TIME?: NO

## 2024-12-23 SDOH — ECONOMIC STABILITY: TRANSPORTATION INSECURITY
IN THE PAST 12 MONTHS, HAS THE LACK OF TRANSPORTATION KEPT YOU FROM MEDICAL APPOINTMENTS OR FROM GETTING MEDICATIONS?: NO

## 2024-12-23 SDOH — ECONOMIC STABILITY: FOOD INSECURITY: WITHIN THE PAST 12 MONTHS, THE FOOD YOU BOUGHT JUST DIDN'T LAST AND YOU DIDN'T HAVE MONEY TO GET MORE.: NEVER TRUE

## 2024-12-23 SDOH — SOCIAL STABILITY: SOCIAL INSECURITY: ARE THERE ANY GUNS KEPT IN OR AROUND YOUR HOME OR WHERE YOUR CHILD SPENDS TIME?: NO

## 2024-12-23 SDOH — HEALTH STABILITY: PHYSICAL HEALTH: ON AVERAGE, HOW MANY DAYS PER WEEK DO YOU ENGAGE IN MODERATE TO STRENUOUS EXERCISE (LIKE A BRISK WALK)?: 4 DAYS

## 2024-12-23 SDOH — HEALTH STABILITY: PHYSICAL HEALTH: ON AVERAGE, HOW MANY MINUTES DO YOU ENGAGE IN EXERCISE AT THIS LEVEL?: 30 MIN

## 2024-12-23 ASSESSMENT — LIFESTYLE VARIABLES
HAVE YOU EVER FELT YOU SHOULD CUT DOWN ON YOUR DRINKING: NO
HOW OFTEN DO YOU HAVE A DRINK CONTAINING ALCOHOL: NEVER
HOW OFTEN DO YOU HAVE SIX OR MORE DRINKS ON ONE OCCASION: NEVER
CONSUMPTION TOTAL: INCOMPLETE
DOES PATIENT WANT TO STOP DRINKING: NO
EVER HAD A DRINK FIRST THING IN THE MORNING TO STEADY YOUR NERVES TO GET RID OF A HANGOVER: NO
TOTAL SCORE: 0
TOTAL SCORE: 0
HOW MANY STANDARD DRINKS CONTAINING ALCOHOL DO YOU HAVE ON A TYPICAL DAY: PATIENT DOES NOT DRINK
HAVE PEOPLE ANNOYED YOU BY CRITICIZING YOUR DRINKING: NO
ALCOHOL_USE: NO
EVER FELT BAD OR GUILTY ABOUT YOUR DRINKING: NO
TOTAL SCORE: 0
SKIP TO QUESTIONS 9-10: 1
AUDIT-C TOTAL SCORE: 0

## 2024-12-23 ASSESSMENT — SOCIAL DETERMINANTS OF HEALTH (SDOH)
DO YOU BELONG TO ANY CLUBS OR ORGANIZATIONS SUCH AS CHURCH GROUPS UNIONS, FRATERNAL OR ATHLETIC GROUPS, OR SCHOOL GROUPS?: YES
HOW OFTEN DO YOU ATTENT MEETINGS OF THE CLUB OR ORGANIZATION YOU BELONG TO?: MORE THAN 4 TIMES PER YEAR
IN THE PAST 12 MONTHS, HAS THE ELECTRIC, GAS, OIL, OR WATER COMPANY THREATENED TO SHUT OFF SERVICE IN YOUR HOME?: NO
HOW OFTEN DO YOU ATTEND CHURCH OR RELIGIOUS SERVICES?: NEVER
WITHIN THE LAST YEAR, HAVE TO BEEN RAPED OR FORCED TO HAVE ANY KIND OF SEXUAL ACTIVITY BY YOUR PARTNER OR EX-PARTNER?: NO
HOW HARD IS IT FOR YOU TO PAY FOR THE VERY BASICS LIKE FOOD, HOUSING, MEDICAL CARE, AND HEATING?: NOT HARD AT ALL
WITHIN THE LAST YEAR, HAVE YOU BEEN HUMILIATED OR EMOTIONALLY ABUSED IN OTHER WAYS BY YOUR PARTNER OR EX-PARTNER?: NO
WITHIN THE LAST YEAR, HAVE YOU BEEN KICKED, HIT, SLAPPED, OR OTHERWISE PHYSICALLY HURT BY YOUR PARTNER OR EX-PARTNER?: NO
DO YOU WORRY THAT YOUR CHILD MAY HAVE BEEN PHYSICALLY ABUSED: NO
DO YOU WORRY THAT YOUR CHILD MAY HAVE BEEN SEXUALLY ABUSED: NO
HOW OFTEN DO YOU GET TOGETHER WITH FRIENDS OR RELATIVES?: MORE THAN THREE TIMES A WEEK
IN A TYPICAL WEEK, HOW MANY TIMES DO YOU TALK ON THE PHONE WITH FAMILY, FRIENDS, OR NEIGHBORS?: MORE THAN THREE TIMES A WEEK
WITHIN THE LAST YEAR, HAVE YOU BEEN AFRAID OF YOUR PARTNER OR EX-PARTNER?: NO

## 2024-12-23 ASSESSMENT — EDINBURGH POSTNATAL DEPRESSION SCALE (EPDS)
I HAVE BLAMED MYSELF UNNECESSARILY WHEN THINGS WENT WRONG: NOT VERY OFTEN
I HAVE BEEN ABLE TO LAUGH AND SEE THE FUNNY SIDE OF THINGS: AS MUCH AS I ALWAYS COULD
I HAVE BEEN SO UNHAPPY THAT I HAVE HAD DIFFICULTY SLEEPING: NOT AT ALL
I HAVE LOOKED FORWARD WITH ENJOYMENT TO THINGS: AS MUCH AS I EVER DID
I HAVE BEEN ANXIOUS OR WORRIED FOR NO GOOD REASON: HARDLY EVER
THINGS HAVE BEEN GETTING ON TOP OF ME: NO, I HAVE BEEN COPING AS WELL AS EVER
I HAVE FELT SAD OR MISERABLE: NO, NOT AT ALL
THE THOUGHT OF HARMING MYSELF HAS OCCURRED TO ME: NEVER
I HAVE FELT SCARED OR PANICKY FOR NO GOOD REASON: NO, NOT AT ALL
I HAVE BEEN SO UNHAPPY THAT I HAVE BEEN CRYING: NO, NEVER
TOTAL SCORE: 2

## 2024-12-23 ASSESSMENT — PAIN SCALES - GENERAL: PAINLEVEL: 0 - NO PAIN

## 2024-12-23 ASSESSMENT — PATIENT HEALTH QUESTIONNAIRE - PHQ9
2. FEELING DOWN, DEPRESSED, IRRITABLE, OR HOPELESS: NOT AT ALL
1. LITTLE INTEREST OR PLEASURE IN DOING THINGS: NOT AT ALL
SUM OF ALL RESPONSES TO PHQ9 QUESTIONS 1 AND 2: 0

## 2024-12-23 ASSESSMENT — FIBROSIS 4 INDEX: FIB4 SCORE: 0.49

## 2024-12-23 NOTE — PROGRESS NOTES
1024- 24 y.o , 38/5, scheduled IOL due to IUGR. Pt verbalizes pos fetal movement, denies LOF or bleeding.  Pt states she does feel tightening of uterus but not painful contractions.  Discussed POC  IV started, labs drawn.  1130- SVE:   Discussed POC w/ MD. Orders to start PIT and antibios for GBS+  1516- NOVI applied due to patient wanting to walk halls  1520- troubleshooting NOVI, unable to titrate PIT due to intermittent tracing on FHR and possible decels  - SVE: /

## 2024-12-23 NOTE — ED PROVIDER NOTES
The patient was admitted directly to L&D for scheduled IOL and spent 0 minutes in triage. Please see H&P for details.    Mone Crews MD

## 2024-12-23 NOTE — H&P
OB H&P:    CC: IOL    HPI:  Alessandra Arita is a 24 y.o.  @ 38w5d by LMP presenting for IOL for IUGR.    Contractions: mild and irregular  Loss of fluid: No   Vaginal bleeding: No   Fetal movement: present      PNC with [facility] since 12 weeks gestation  IUGR  - most recent U/S on 12/10/24 with EFW 27th percentile and AC 9th percentile  Fetal lacrimal duct cyst noted on prenatal U/S    PMH:  - bicuspid aortic valve, follows with cardiologist. [facility] recommends  echocardiogram.   - h/o cleft palate  - h/o feeding tube in early life due to above    PNL:  Rh negative, RI, HIV NR, treponemal Ab NR, HBsAg NR, HCV Ab NR, GC/CT neg/neg  GBS positive  GTT 96    Allergies:   cephalexin, urticaria  Per patient's father, she has had PCNs (amox-clav) multiple times in her life since cephalexin allergy noted with no reactions.     ROS:  Const: denies fevers, general concerns  ENT: Denies rhinorrhea, congestion, sore throat  CV: Denies CP or significant peripheral edema  Resp: Denies dyspnea, cough  GI: denies abd pain, GI concerns  : see HPI  Neuro: denies new HA or vision changes    OB History    Para Term  AB Living   1             SAB IAB Ectopic Molar Multiple Live Births                    # Outcome Date GA Lbr Leonardo/2nd Weight Sex Type Anes PTL Lv   1 Current                GYN: denies STIs, no cervical procedures    Past Medical History:   Diagnosis Date    Bicuspid aortic valve        Past Surgical History:   Procedure Laterality Date    CLEFT PALATE REPAIR         No current facility-administered medications on file prior to encounter.     Current Outpatient Medications on File Prior to Encounter   Medication Sig Dispense Refill    aspirin 81 MG EC tablet Take 81 mg by mouth every day.      Prenatal MV-Min-Fe Fum-FA-DHA (PRENATAL 1 PO) Take  by mouth.      folic acid (FOLVITE) 1 MG Tab          Family History   Problem Relation Age of Onset    Heart Disease Mother         PREMATURE HEART DISEASE AT  "AGE 45    Other Mother         Marfan disease    Other Father         Crohn's disease    Heart Disease Brother     Diabetes Paternal Uncle     Cancer Maternal Grandfather     Diabetes Paternal Grandfather        Social History     Socioeconomic History    Marital status:      Spouse name: Not on file    Number of children: Not on file    Years of education: Not on file    Highest education level: Not on file   Occupational History    Not on file   Tobacco Use    Smoking status: Never    Smokeless tobacco: Never   Vaping Use    Vaping status: Never Used   Substance and Sexual Activity    Alcohol use: Not Currently     Comment: SOCIAL    Drug use: Not Currently    Sexual activity: Yes     Partners: Male     Comment:    Other Topics Concern    Not on file   Social History Narrative    Not on file     Social Drivers of Health     Financial Resource Strain: Not on file   Food Insecurity: Not on file   Transportation Needs: Not on file   Physical Activity: Not on file   Stress: Not on file   Social Connections: Not on file   Intimate Partner Violence: Not on file   Housing Stability: Not on file       PE:  Vitals:    24 1030 24 1100   BP: 134/86    Pulse: 100 (!) 101   Resp: 16    Temp: 36.7 °C (98.1 °F)    TempSrc: Temporal    SpO2: 98% 98%   Weight: 74.8 kg (165 lb)    Height: 1.651 m (5' 5\")      Gen: alert, conversant, no acute distress  CV: RRR, nl S1 and S2 without murmurs, cap refill <2 sec  Resp: Unlabored respirations, symmetric chest rise, CTAB  abd: soft, gravid, NT, EFW 3959-2774 g, vertex by Leopold's an confirmed by BSUS  Ext: NT, no edema  Neuro: No gross focal deficits, sensation intact to light touch throughout    SVE: 1-2/90/-1 to 0, soft, anterior  FHT: 140/moderate variability/+ accels/ no decels  ruth: irregular ctx Q5-11 minutes with irritability    A/P: 24 y.o.  @ 38w5d by LMP presenting for scheduled IOL for IUGR.     #SIUP  #IUGR  IUGR with AC 9th percentile; EFW on " most recent U?S 27th percentile  -/-1 to 0 station, anterior, soft; Lopez score of 10  Rh negative  GBS positive  - Admit to L&D  - PIV  - cEFM  - PCN for GBS (confirmed has had this class since development of cephalexin allergy)  - induction with pitocin  - Will need Rhogam postpartum    #Bicuspid aortic valve  Recommend  echocardiography      Mone Crews M.D.

## 2024-12-23 NOTE — PROGRESS NOTES
MOB prenatal labs reviewed.     Hep B: neg  Hep C: neg  HIV: non-reactive  RPR: non-reactive.   Rubella: immune    ABO: O-    Strep B: positive    GTT: WDL    Fetal anatomy ultrasound: Seen. IUGR. Lacrimal duct cyst. Echo completed due to maternal hx, WDL    Significant Hx: bicuspid aortic valve, repaired cleft palate    Tdap: 10/4/24

## 2024-12-24 LAB
ERYTHROCYTE [DISTWIDTH] IN BLOOD BY AUTOMATED COUNT: 46.4 FL (ref 35.9–50)
HCT VFR BLD AUTO: 36.9 % (ref 37–47)
HGB BLD-MCNC: 12.7 G/DL (ref 12–16)
IMMUNE ROSETTING TEST 8505FMH: NORMAL
MCH RBC QN AUTO: 31.3 PG (ref 27–33)
MCHC RBC AUTO-ENTMCNC: 34.4 G/DL (ref 32.2–35.5)
MCV RBC AUTO: 90.9 FL (ref 81.4–97.8)
NUMBER OF RH DOSES IND 8505RD: 1
PLATELET # BLD AUTO: 263 K/UL (ref 164–446)
PMV BLD AUTO: 9.6 FL (ref 9–12.9)
RBC # BLD AUTO: 4.06 M/UL (ref 4.2–5.4)
WBC # BLD AUTO: 17.2 K/UL (ref 4.8–10.8)

## 2024-12-24 PROCEDURE — 700111 HCHG RX REV CODE 636 W/ 250 OVERRIDE (IP): Performed by: STUDENT IN AN ORGANIZED HEALTH CARE EDUCATION/TRAINING PROGRAM

## 2024-12-24 PROCEDURE — 0UQKXZZ REPAIR HYMEN, EXTERNAL APPROACH: ICD-10-PCS | Performed by: STUDENT IN AN ORGANIZED HEALTH CARE EDUCATION/TRAINING PROGRAM

## 2024-12-24 PROCEDURE — 59409 OBSTETRICAL CARE: CPT

## 2024-12-24 PROCEDURE — 85461 HEMOGLOBIN FETAL: CPT

## 2024-12-24 PROCEDURE — 59400 OBSTETRICAL CARE: CPT | Performed by: OBSTETRICS & GYNECOLOGY

## 2024-12-24 PROCEDURE — 36415 COLL VENOUS BLD VENIPUNCTURE: CPT

## 2024-12-24 PROCEDURE — A9270 NON-COVERED ITEM OR SERVICE: HCPCS | Performed by: STUDENT IN AN ORGANIZED HEALTH CARE EDUCATION/TRAINING PROGRAM

## 2024-12-24 PROCEDURE — 700102 HCHG RX REV CODE 250 W/ 637 OVERRIDE(OP): Performed by: PHYSICIAN ASSISTANT

## 2024-12-24 PROCEDURE — A9270 NON-COVERED ITEM OR SERVICE: HCPCS | Performed by: PHYSICIAN ASSISTANT

## 2024-12-24 PROCEDURE — 85027 COMPLETE CBC AUTOMATED: CPT

## 2024-12-24 PROCEDURE — 700101 HCHG RX REV CODE 250: Performed by: STUDENT IN AN ORGANIZED HEALTH CARE EDUCATION/TRAINING PROGRAM

## 2024-12-24 PROCEDURE — 700102 HCHG RX REV CODE 250 W/ 637 OVERRIDE(OP): Performed by: STUDENT IN AN ORGANIZED HEALTH CARE EDUCATION/TRAINING PROGRAM

## 2024-12-24 PROCEDURE — 770002 HCHG ROOM/CARE - OB PRIVATE (112)

## 2024-12-24 PROCEDURE — 700105 HCHG RX REV CODE 258: Performed by: STUDENT IN AN ORGANIZED HEALTH CARE EDUCATION/TRAINING PROGRAM

## 2024-12-24 PROCEDURE — 3E0334Z INTRODUCTION OF SERUM, TOXOID AND VACCINE INTO PERIPHERAL VEIN, PERCUTANEOUS APPROACH: ICD-10-PCS | Performed by: STUDENT IN AN ORGANIZED HEALTH CARE EDUCATION/TRAINING PROGRAM

## 2024-12-24 PROCEDURE — 99999 PR NO CHARGE: CPT | Performed by: STUDENT IN AN ORGANIZED HEALTH CARE EDUCATION/TRAINING PROGRAM

## 2024-12-24 PROCEDURE — 304965 HCHG RECOVERY SERVICES

## 2024-12-24 RX ORDER — ACETAMINOPHEN 500 MG
1000 TABLET ORAL EVERY 6 HOURS PRN
Status: DISCONTINUED | OUTPATIENT
Start: 2024-12-24 | End: 2024-12-25 | Stop reason: HOSPADM

## 2024-12-24 RX ORDER — DOCUSATE SODIUM 100 MG/1
100 CAPSULE, LIQUID FILLED ORAL 2 TIMES DAILY PRN
Status: DISCONTINUED | OUTPATIENT
Start: 2024-12-24 | End: 2024-12-25 | Stop reason: HOSPADM

## 2024-12-24 RX ORDER — MISOPROSTOL 200 UG/1
600 TABLET ORAL
Status: DISCONTINUED | OUTPATIENT
Start: 2024-12-24 | End: 2024-12-25 | Stop reason: HOSPADM

## 2024-12-24 RX ORDER — SODIUM CHLORIDE, SODIUM LACTATE, POTASSIUM CHLORIDE, CALCIUM CHLORIDE 600; 310; 30; 20 MG/100ML; MG/100ML; MG/100ML; MG/100ML
2000 INJECTION, SOLUTION INTRAVENOUS PRN
Status: DISCONTINUED | OUTPATIENT
Start: 2024-12-24 | End: 2024-12-25 | Stop reason: HOSPADM

## 2024-12-24 RX ORDER — IBUPROFEN 800 MG/1
800 TABLET, FILM COATED ORAL EVERY 8 HOURS PRN
Status: DISCONTINUED | OUTPATIENT
Start: 2024-12-24 | End: 2024-12-25 | Stop reason: HOSPADM

## 2024-12-24 RX ADMIN — EPHEDRINE SULFATE 5 MG: 50 INJECTION, SOLUTION INTRAVENOUS at 00:07

## 2024-12-24 RX ADMIN — IBUPROFEN 800 MG: 800 TABLET, FILM COATED ORAL at 07:36

## 2024-12-24 RX ADMIN — SODIUM CHLORIDE 2.5 MILLION UNITS: 9 INJECTION, SOLUTION INTRAVENOUS at 01:29

## 2024-12-24 RX ADMIN — OXYTOCIN 10 UNITS: 10 INJECTION, SOLUTION INTRAMUSCULAR; INTRAVENOUS at 02:40

## 2024-12-24 RX ADMIN — HUMAN RHO(D) IMMUNE GLOBULIN 300 MCG: 1500 SOLUTION INTRAMUSCULAR; INTRAVENOUS at 18:02

## 2024-12-24 RX ADMIN — ACETAMINOPHEN 1000 MG: 500 TABLET ORAL at 07:37

## 2024-12-24 RX ADMIN — OXYTOCIN 125 ML/HR: 10 INJECTION, SOLUTION INTRAMUSCULAR; INTRAVENOUS at 08:31

## 2024-12-24 RX ADMIN — DOCUSATE SODIUM 100 MG: 100 CAPSULE, LIQUID FILLED ORAL at 14:43

## 2024-12-24 ASSESSMENT — PAIN DESCRIPTION - PAIN TYPE
TYPE: ACUTE PAIN

## 2024-12-24 ASSESSMENT — EDINBURGH POSTNATAL DEPRESSION SCALE (EPDS)
I HAVE BEEN ANXIOUS OR WORRIED FOR NO GOOD REASON: HARDLY EVER
I HAVE BEEN ABLE TO LAUGH AND SEE THE FUNNY SIDE OF THINGS: AS MUCH AS I ALWAYS COULD
I HAVE BEEN SO UNHAPPY THAT I HAVE BEEN CRYING: NO, NEVER
THINGS HAVE BEEN GETTING ON TOP OF ME: NO, I HAVE BEEN COPING AS WELL AS EVER
I HAVE LOOKED FORWARD WITH ENJOYMENT TO THINGS: AS MUCH AS I EVER DID
I HAVE FELT SAD OR MISERABLE: NO, NOT AT ALL
I HAVE FELT SCARED OR PANICKY FOR NO GOOD REASON: NO, NOT AT ALL
I HAVE BEEN SO UNHAPPY THAT I HAVE HAD DIFFICULTY SLEEPING: NOT AT ALL
I HAVE BLAMED MYSELF UNNECESSARILY WHEN THINGS WENT WRONG: NO, NEVER
THE THOUGHT OF HARMING MYSELF HAS OCCURRED TO ME: NEVER

## 2024-12-24 NOTE — ANESTHESIA POSTPROCEDURE EVALUATION
Patient: Alessandra Arita    Procedure Summary       Date: 12/23/24 Room / Location:     Anesthesia Start: 2107 Anesthesia Stop: 12/24/24 0240    Procedure: Labor Epidural Diagnosis:     Scheduled Providers:  Responsible Provider: Sourav Samano D.O.    Anesthesia Type: epidural ASA Status: 2            Final Anesthesia Type: epidural  Last vitals  BP   Blood Pressure: 107/55    Temp   36.8 °C (98.2 °F)    Pulse   (!) 117   Resp   16    SpO2   96 %      Anesthesia Post Evaluation    Patient location during evaluation: bedside  Patient participation: complete - patient participated  Level of consciousness: awake and alert    Airway patency: patent  Anesthetic complications: no  Cardiovascular status: hemodynamically stable  Respiratory status: acceptable  Hydration status: euvolemic    PONV: none          No notable events documented.     Nurse Pain Score: 0 (NPRS)

## 2024-12-24 NOTE — L&D DELIVERY NOTE
DATE OF SERVICE:  2024     This 24-year-old  1, para 0,  female, with an intrauterine   pregnancy at 38 weeks and 6/7 days, under epidural anesthesia, delivered a   viable male infant with Apgar scores of 8 and 9.  Weight is 6 pounds 10.2   ounces or 3010 grams.  The patient was admitted as an induction of labor   secondary to IUGR, received Pitocin and an epidural, and progressed to   complete.  The patient also had a spontaneous rupture of membranes with clear   fluid and GBS was positive, so received at least 2 doses of penicillin prior   to delivery.  Delivery was via normal spontaneous vaginal delivery to a   sterile field in an occiput anterior position.  Nuchal cord x1 was reduced   after delivery of the infant.  Left anterior shoulder was delivered without   incident.  Infant was placed on maternal abdomen.  Per request of the patient,   cord was immediately clamped by myself and cut by the father of the baby as   they did not want delayed cord clamping.  An intact placenta with a 3-vessel   cord delivered spontaneously at 0245 hours.  Pitocin was then started wide   open in the IV.  The vagina was explored and bilateral hymenal ring abrasions   were noted.  The right side was repaired in a normal fashion under epidural   anesthesia with a 3-0 chromic.  The patient tolerated well.  The patient had   excellent hemostasis and fundus was found to be firm.  Estimated blood loss   was 150 mL.  Dr. Tran is the attending.  Also, of note, there will be a   postpartum echo performed on the baby due to a maternal history of a bicuspid   aortic valve.        ______________________________  MAYRA Ashraf        ______________________________  DO JOVANNY Mccurdy/NEHAL    DD:  2024 04:43  DT:  2024 07:21    Job#:  [id number]
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
## 2024-12-24 NOTE — PROGRESS NOTES
0830 - Patient transferred from labor and delivery in wheelchair with infant in arms and FOB accompanying with belongings. Two RN verification of infant and parent armbands. Report received from AMARI Shore RN. Patient oriented to unit and POC. Assessment completed. Pain management discussed. Patient declines intervention for pain at this time. Will call for PRN pain medication. Second bag of pitocin infusing at 125 ml/hr. IV patent, no s/s of infiltration at insertion site. Parents educated to offer feedings on cue, at minimum of Q3 hours. Parents verbalize understanding and will update infant I&O chart. Parents educated on room, medications, safe sleep, emergency cord, and infant care. Patient encouraged to call with needs. Call light within reach. All concerns and questions answered at this time.

## 2024-12-24 NOTE — PROGRESS NOTES
1900 - Report received from Jerry YOUNGBLOOD, POC discussed, pt on birthing ball, no other needs at this time, care assumed.     2110 - Selwyn Ortega at bedside for epidural placement.    2111 - timeout complete, pt VSS    2119 - test dose negative, pt VSS.    2310 - Selwyn ORTEGA at bedside for replacement of epidural, test dose negative, pt VSS.    1163 - Report given to Oumou YOUNGBLOOD, care transferred.

## 2024-12-24 NOTE — ANESTHESIA PREPROCEDURE EVALUATION
Date: 12/23/24  Procedure: Labor Epidural       Anes H&P:  PAST MEDICAL HISTORY:   24 y.o. female who presents for labor epidural.  She has current and past medical problems significant for:    Past Medical History:   Diagnosis Date    Bicuspid aortic valve        SMOKING/ALCOHOL/RECREATIONAL DRUG USE:  Social History     Tobacco Use    Smoking status: Never    Smokeless tobacco: Never   Vaping Use    Vaping status: Never Used   Substance Use Topics    Alcohol use: Not Currently     Comment: SOCIAL    Drug use: Not Currently     Social History     Substance and Sexual Activity   Drug Use Not Currently       PAST SURGICAL HISTORY:  Past Surgical History:   Procedure Laterality Date    CLEFT PALATE REPAIR         ALLERGIES:   Allergies   Allergen Reactions    Cephalosporins     Keflex      Hives all over body.       MEDICATIONS:  No current facility-administered medications on file prior to encounter.     Current Outpatient Medications on File Prior to Encounter   Medication Sig Dispense Refill    aspirin 81 MG EC tablet Take 81 mg by mouth every day.      Prenatal MV-Min-Fe Fum-FA-DHA (PRENATAL 1 PO) Take  by mouth.      folic acid (FOLVITE) 1 MG Tab          LABS:  Lab Results   Component Value Date/Time    HEMOGLOBIN 15.3 12/23/2024 1115    HEMATOCRIT 44.4 12/23/2024 1115    WBC 9.9 12/23/2024 1115     Lab Results   Component Value Date/Time    SODIUM 141 01/03/2023 1642    POTASSIUM 3.7 01/03/2023 1642    CHLORIDE 104 01/03/2023 1642    CO2 24 01/03/2023 1642    GLUCOSE 85 01/03/2023 1642    BUN 10 01/03/2023 1642    CALCIUM 10.0 01/03/2023 1642         PREVIOUS ANESTHETICS: See EMR  __________________________________________    Relevant Problems   Other   (positive) Bicuspid aortic valve       Physical Exam    Airway   Mallampati: II  TM distance: >3 FB  Neck ROM: full       Cardiovascular - normal exam  Rhythm: regular  Rate: normal  (-) murmur     Dental - normal exam           Pulmonary - normal exam  Breath  sounds clear to auscultation     Abdominal    Neurological - normal exam                   Anesthesia Plan    ASA 2       Plan - epidural   Neuraxial block will be labor analgesia                  Pertinent diagnostic labs and testing reviewed    Informed Consent:    Anesthetic plan and risks discussed with patient.

## 2024-12-24 NOTE — LACTATION NOTE
This note was copied from a baby's chart.  I met with parents of baby this morning to offer breast feeding and lactation support. We covered topics of feeding cues, optimal latch angle and infant and maternal body positioning. I placed baby Te skin to skin with Alessandra but he was pretty sleepy and spit up a moderate amount of clear liquid.    I advised them to view some videos today on the [facility] which will offer more in-depth information.    Patient was encouraged to ask her nurse to contact us for any Lactation concerns. Explained that LC's are available daily to assist RN's with breast feeding plans for their patients as part of our team approach. White board was updated with my name.

## 2024-12-24 NOTE — CARE PLAN
The patient is Stable - Low risk of patient condition declining or worsening    Shift Goals  Clinical Goals: cervical change  Patient Goals: healthy mom, healthy baby  Family Goals: emotional support    Progress made toward(s) clinical / shift goals:    Problem: Knowledge Deficit - L&D  Goal: Patient and family/caregivers will demonstrate understanding of plan of care, disease process/condition, diagnostic tests and medications  Description: Target End Date:  1-3 days or as soon as patient condition allows    1.  Oriented to unit, equipment, visitation policy and means for communicating concern  2.  Complete/review Learning Assessment  3.  Assess patient's preparation, knowledge level and expectations  4.  Provide accurate information in basic terms, clarify misconceptions  5.  Explain disease process/condition, treatment plan, diagnostic tests and medications  6.  Encourage patient and support person to ask questions and verbalize their understanding  7.  Instruct patient/support person in basic interpretation of fetal monitor  8.  Obtain informed consent when appropriate  9.  Demonstrate breathing/relaxation techniques  Outcome: Progressing     Problem: Risk for Excess Fluid Volume  Goal: Patient will demonstrate pulse, blood pressure and neurologic signs within expected ranges and without any respiratory complications  Description: 1.  Monitor for signs of hypertension and tachycardia; observe for signs of dyspnea; auscultate for signs of stridor, rhonchi or moist crackles  2.  Monitor for the intake/output.  Check the infusion rate of the fluids manually or preferably through the use of infusion pumps  3.  Monitor hemoglobin/hematocrit and platelets   Outcome: Progressing     Problem: Psychosocial - L&D  Goal: Patient's level of anxiety will decrease  Description: Target End Date:  1-3 days or as soon as patient condition allows    1.  Collaborate with patient and family/caregiver to identify triggers and  develop strategies to cope with anxiety  2.  Implement stimuli reduction, calming techniques  3.  Pharmacologic management per provider order  4.  Encourage patient/family/care giver participation  5.  Collaborate with interdisciplinary team including Psychologist or Behavioral Health Team as needed  Outcome: Progressing  Note: Cont education regarding labor process  Goal: Patient will be able to discuss coping skills during hospitalization  Description: Target End Date:  1 to 3 days    1.  Assess patient and support person's emotional response  2.  Review patient and support person's participation and role in birth experience.  Identify positive behaviors during the process  3.  Encourage presence/participation of support person  Outcome: Progressing  Goal: Patient's ability to re-evaluate and adapt role responsibilities will improve  Description: Target End Date:  Prior to discharge or change in level of care    1.  Assess family support  2.  Encourage support system participation in care  3.  Encouraged verbalization of feelings regarding caregiver responsibilities  4.  Discuss changes in role and responsibilities caused by patient's condition  Outcome: Progressing  Goal: Spiritual and cultural needs incorporated into hospitalization  Description: Target End Date:  End of day 1    1.  Encourage verbalization of feelings, concerns, expectations and needs  2.  Collaborate with Case Management/  3.  Collaborate with Pastoral Care to meet spiritual needs  Outcome: Progressing     Problem: Risk for Venous Thromboembolism (VTE)  Goal: VTE prevention measures will be implemented and patient will remain free from VTE  Description: Target End Date:  Prior to discharge or change in level of care    1.  Intermittent sequential compression device in place 23 hours per day or per provider order  2.  Pharmacologic prophylaxis per provider order  3.  Monitor for anticoagulation complications  4.  Educate patient  and family/caregiver about the importance of intermittent sequential and early ambulation when able  5.  Educate/demonstrate to patient and family/caregiver about ankle flex, foot rotation and knee flex exercises in addition to other prophylactic measures every hour while awake  Outcome: Progressing     Problem: Risk for Infection and Impaired Wound Healing  Goal: Patient will remain free from infection  Description: Target End Date:  1 to 3 days    1.  Utilize Standard Precautions at all times to reduce the risk of transmission of microorganisms from both recognized and unrecognized sources of infection  2.  Infection prevention handouts provided (general/device/diagnosis specific) and documented in Patient Education  3.  Limit vaginal exams as necessary  4.  Use aseptic technique during vaginal exams  5.  Administer antibiotic therapy per provider order  6.  Assess for removal of lines and drains  7.  Line/drain care per policy and/or provider orders  Outcome: Progressing  Goal: Patient's wound/surgical incision will decrease in size and heals properly  Description: Target End Date:  Prior to discharge or change in level of care    Document on LDA    1.  Assess and document surgical incision/wound  2.  Provide incision/wound care per policy and/or provider orders  3.  Manage surgical drains per policy if applicable  4.  Encourage adequate nutrition to promote wound healing  5.  Collaborate with Clinical Dietician  Outcome: Progressing     Problem: Risk for Fluid Imbalance  Goal: Patient's fluid volume balance will be maintained or improve  Description: Target End Date:  1 to 3 days    1.  Assess for signs and symptoms of postpartum and postoperative bleeding as appropriate  2.  Monitor drainage for color, consistency, quantity  3.  Report inadequate intake or output to provider  4.  Promote oral intake as appropriate  5.  Administer IV therapy as ordered  6.  Discontinue IV fluids when tolerating PO or per  provider order  Outcome: Progressing     Problem: Risk for Injury  Goal: Patient and fetus will be free of preventable injury/complications  Description: Target End Date:  Prior to discharge or change in level of care    1.  Monitor uterine activity and if applicable progression of labor  2.  Monitor fetal well being  3.  Assure resuscitative equipment is available and within reach  Outcome: Progressing  Note: Cont FHM, routine uterine assessments, routine vitals     Problem: Pain  Goal: Patient's pain will be alleviated or reduced to the patient’s comfort goal  Description: Target End Date:  Prior to discharge or change in level of care    1.  Document pain using the appropriate pain scale per order or unit policy  2.  Administer pain medications per provider order and/or assist with epidural/spinal placement as needed  3.  Pain management medications as ordered  4.  Educate and implement non-pharmacologic comfort measures (i.e. relaxation, distraction, massage, cold/heat therapy, etc.)  5.  Assess for nonverbal signs of ineffective coping with pain and offer pain medication and/or epidural anesthesia  Outcome: Progressing  Note: Pt coping well w/ CTX's, educated on pain management options     Problem: Discharge Barriers/Planning  Goal: Patient's continuum of care needs are met  Description: Target End Date:  Prior to discharge or change in level of care    1.  Identify potential discharge barriers on admission and throughout hospitalization  2.  Collaborate with Case Management, , Clinical Educators, Navigators and others on the transitional care team to meet discharge needs  3.  Involve patient/family/caregivers in setting and prioritizing goals for hospitalization and discharge  4.  Ensure Flu vaccinations are addressed  5.  Inquire if patient is interested in the Meds to Bed program  6.  Ensure patient and family/caregiver are able to demonstrate use of equipment as prescribed  7.  Ensure patient  and family/caregiver can verbalize understanding of patient education  8.  Explain discharge instructions and medication reconciliation to patient and family/caregiver  Outcome: Progressing       Patient is not progressing towards the following goals:N/A

## 2024-12-24 NOTE — PROGRESS NOTES
0415- Report received from Oumou YOUNGBLOOD. POC reviewed with pt, verbalizes understanding and states no questions at this time. Assessment completed.     9024- Report given to Lazara YOUNGBLOOD, care relinquished at this time.

## 2024-12-24 NOTE — ANESTHESIA PROCEDURE NOTES
Epidural Block    Date/Time: 12/23/2024 11:10 PM    Performed by: Sourav Samano D.O.  Authorized by: Sourav Samano D.O.    Patient Location:  OB  Start Time:  12/23/2024 11:10 PM  Reason for Block: labor analgesia    patient identified, IV checked, site marked, risks and benefits discussed, surgical consent, monitors and equipment checked, pre-op evaluation and timeout performed    Patient Position:  Sitting  Prep: ChloraPrep, patient draped and sterile technique    Monitoring:  Blood pressure, continuous pulse oximetry and heart rate  Approach:  Midline  Location:  L2-L3  Injection Technique:  POPPY saline  Skin infiltration:  Lidocaine  Strength:  1%  Dose:  3ml  Needle Type:  Tuohy  Needle Gauge:  17 G  Needle Length:  3.5 in  Loss of resistance::  5.5  Catheter Size:  19 G  Catheter at Skin Depth:  14  Test Dose Result:  Negative   Patient completely numb on right side and minimal pain relief on left with 1st epidural. Attempted to bolus it without improvement. Epidural removed and replaced. 1 pass again, no apparent complications.

## 2024-12-24 NOTE — LACTATION NOTE
Alessandra reports that baby Te has latched again this morning and nursed well for about 5 minutes. He then stooled and had a spit up. I encouraged her to try to arrange some sleep time for herself this afternoon as it is likely baby will be wanting to nurse longer and frequently at times tonight.    She is aware of this and will try to ensure some sleep.

## 2024-12-24 NOTE — ANESTHESIA TIME REPORT
Anesthesia Start and Stop Event Times       Date Time Event    12/23/2024 2105 Ready for Procedure     2107 Anesthesia Start    12/24/2024 0240 Anesthesia Stop          Responsible Staff  12/23/24 to 12/24/24      Name Role Begin End    Sourav Samano D.O. Anesth 2107 0240          Overtime Reason:  no overtime (within assigned shift)    Comments:

## 2024-12-24 NOTE — ANESTHESIA PROCEDURE NOTES
Epidural Block    Date/Time: 12/23/2024 9:15 PM    Performed by: Sourav Samano D.O.  Authorized by: Sourav Samano D.O.    Patient Location:  OB  Start Time:  12/23/2024 9:15 PM  Reason for Block: labor analgesia    patient identified, IV checked, site marked, risks and benefits discussed, surgical consent, monitors and equipment checked, pre-op evaluation and timeout performed    Patient Position:  Sitting  Prep: ChloraPrep, patient draped and sterile technique    Monitoring:  Blood pressure, continuous pulse oximetry and heart rate  Approach:  Midline  Location:  L3-L4  Injection Technique:  POPPY saline  Skin infiltration:  Lidocaine  Strength:  1%  Dose:  3ml  Needle Type:  Tuohy  Needle Gauge:  17 G  Needle Length:  3.5 in  Loss of resistance::  5  Catheter Size:  19 G  Catheter at Skin Depth:  11  Test Dose Result:  Negative   1 pass, no apparent complications

## 2024-12-24 NOTE — PROGRESS NOTES
4877- report received from Mel YOUNGBLOOD  0216- Ross NUNEZ called to bedside for delivery  8664- report given to Beatrice YOUNGBLOOD

## 2024-12-25 ENCOUNTER — PHARMACY VISIT (OUTPATIENT)
Dept: PHARMACY | Facility: MEDICAL CENTER | Age: 24
End: 2024-12-25
Payer: COMMERCIAL

## 2024-12-25 VITALS
HEART RATE: 84 BPM | WEIGHT: 165 LBS | BODY MASS INDEX: 27.49 KG/M2 | SYSTOLIC BLOOD PRESSURE: 123 MMHG | TEMPERATURE: 98.7 F | OXYGEN SATURATION: 97 % | HEIGHT: 65 IN | DIASTOLIC BLOOD PRESSURE: 90 MMHG | RESPIRATION RATE: 17 BRPM

## 2024-12-25 PROCEDURE — 700102 HCHG RX REV CODE 250 W/ 637 OVERRIDE(OP): Performed by: PHYSICIAN ASSISTANT

## 2024-12-25 PROCEDURE — A9270 NON-COVERED ITEM OR SERVICE: HCPCS | Performed by: PHYSICIAN ASSISTANT

## 2024-12-25 PROCEDURE — RXMED WILLOW AMBULATORY MEDICATION CHARGE: Performed by: NURSE PRACTITIONER

## 2024-12-25 RX ORDER — ACETAMINOPHEN AND CODEINE PHOSPHATE 120; 12 MG/5ML; MG/5ML
1 SOLUTION ORAL DAILY
Qty: 28 TABLET | Refills: 11 | Status: SHIPPED | OUTPATIENT
Start: 2024-12-25

## 2024-12-25 RX ORDER — IBUPROFEN 800 MG/1
800 TABLET, FILM COATED ORAL EVERY 8 HOURS PRN
Qty: 30 TABLET | Refills: 0 | Status: SHIPPED | OUTPATIENT
Start: 2024-12-25

## 2024-12-25 RX ORDER — PSEUDOEPHEDRINE HCL 30 MG
100 TABLET ORAL 2 TIMES DAILY PRN
Qty: 60 CAPSULE | Refills: 0 | Status: SHIPPED | OUTPATIENT
Start: 2024-12-25

## 2024-12-25 RX ORDER — ACETAMINOPHEN 500 MG
1000 TABLET ORAL EVERY 6 HOURS PRN
Qty: 30 TABLET | Refills: 0 | Status: SHIPPED | OUTPATIENT
Start: 2024-12-25

## 2024-12-25 RX ADMIN — ACETAMINOPHEN 1000 MG: 500 TABLET ORAL at 05:37

## 2024-12-25 ASSESSMENT — PAIN DESCRIPTION - PAIN TYPE
TYPE: ACUTE PAIN

## 2024-12-25 NOTE — LACTATION NOTE
This note was copied from a baby's chart.  I observed mother and baby breastfeeding this morning and was able to offer some tips on maternal and infant positioning. Alessandra is feeling confident about going home.    Discharge resources discussed and [facility] flyer provided.

## 2024-12-25 NOTE — CARE PLAN
The patient is Stable - Low risk of patient condition declining or worsening    Shift Goals  Clinical Goals: Establish breastfeeding and pain management    Progress made toward(s) clinical / shift goals:      Problem: Psychosocial - Postpartum  Goal: Patient will verbalize and demonstrate effective bonding and parenting behavior  Outcome: Progressing  Pt interacting with infant appropriately and completing infant cares with FOB at bedside     Problem: Altered Physiologic Condition  Goal: Patient physiologically stable as evidenced by normal lochia, palpable uterine involution and vitals within normal limits  Outcome: Progressing  Pt maintaining firm fundus and lochia WDL      Patient is not progressing towards the following goals:

## 2024-12-25 NOTE — DISCHARGE INSTRUCTIONS

## 2024-12-25 NOTE — PROGRESS NOTES
Shift Goals  Clinical Goals: vitals stable, fundus firm, ambulating and voiding without difficulty  Patient Goals: pain control, infant cares  Family Goals: support, bonding    Progress made toward(s) clinical / shift goals:  Alessandra is postpartum with her firs baby, her vitals are stable, her fundus is firm, she is ambulating and voiding without difficulty, her pain is low and she denies need for pain medication, her SO is at bedside, together they are doing all infant cares independently and asking appropriate questions, both are bonding well with baby

## 2024-12-25 NOTE — DISCHARGE SUMMARY
Discharge Summary:      Alessandra Arita    Admit Date:   2024  Discharge Date:  2024     Admitting diagnosis:  Indication for care in labor or delivery [O75.9]  Discharge Diagnosis: Status post vaginal, spontaneous.  Pregnancy Complications: IUGR, lacrimal duct cyst. Maternal history of bicuspid aortic valve and cleft palate   Tubal Ligation:  no        History:  Past Medical History:   Diagnosis Date    Bicuspid aortic valve      OB History    Para Term  AB Living   1 1 1     1   SAB IAB Ectopic Molar Multiple Live Births           0 1      # Outcome Date GA Lbr Leonardo/2nd Weight Sex Type Anes PTL Lv   1 Term 24 38w6d / 00:51 3.01 kg (6 lb 10.2 oz) M Vag-Spont EPI N MOE        Cephalosporins and Keflex  Patient Active Problem List    Diagnosis Date Noted    Positive GBS test 12/10/2024    Poor fetal growth affecting management of mother in third trimester 2024    History of depression and anxiety 10/18/2024    Supervision of first pregnancy 10/18/2024    Rh negative status during pregnancy in second trimester 2024    History of cleft palate 2024    Bicuspid aortic valve 07/15/2005        Hospital Course:   24 y.o. , now para 1, was admitted with the above mentioned diagnosis, underwent Active Labor, vaginal, spontaneous. Patient postpartum course was unremarkable, with progressive advancement in diet , ambulation and toleration of oral analgesia. Patient without complaints today and desires discharge.      Vitals:    24 0830 24 1012 24 1420 24 1838   BP: (!) 130/90 127/89 120/81 (!) 129/90   Pulse: 96 99 89 92   Resp:    Temp: 37.3 °C (99.1 °F) 36.6 °C (97.8 °F) 36.5 °C (97.7 °F) 36.9 °C (98.4 °F)   TempSrc: Temporal Temporal Temporal Temporal   SpO2: 96% 97% 97% 98%   Weight:       Height:           Current Facility-Administered Medications   Medication Dose    lactated ringers infusion  2,000 mL    docusate sodium  (Colace) capsule 100 mg  100 mg    ibuprofen (Motrin) tablet 800 mg  800 mg    acetaminophen (Tylenol) tablet 1,000 mg  1,000 mg    PRN oxytocin (PITOCIN) (20 Units/1000 mL) PRN for excessive uterine bleeding - See Admin Instr  125-999 mL/hr    miSOPROStol (Cytotec) tablet 600 mcg  600 mcg       Exam:  Breast Exam: negative  Abdomen: Abdomen soft, non-tender. BS normal. No masses,  No organomegaly  Fundus Non Tender: yes  Incision: none  Perineum: perineum intact. Franco labial lacerations-repaired  Extremity: extremities, peripheral pulses and reflexes normal     Labs:  Recent Labs     12/23/24  1115 12/24/24  1508   WBC 9.9 17.2*   RBC 4.90 4.06*   HEMOGLOBIN 15.3 12.7   HEMATOCRIT 44.4 36.9*   MCV 90.6 90.9   MCH 31.2 31.3   MCHC 34.5 34.4   RDW 45.6 46.4   PLATELETCT 286 263   MPV 9.5 9.6        Activity:   Discharge to home  Pelvic Rest x 6 weeks    Assessment:  normal postpartum course  Discharge Assessment: No areas of skin breakdown/redness; Taking in adequate diet and fluids, no heavy bleeding or foul smelling discharge, no redness or severe pain of breasts, voiding without difficulty     Desires POPs for BCM     [facility]: [facility] in 5 weeks for vaginal ; 1 week for incision check.     Pt need to call or go to ED for any of the following:  Fever over 100.5  Severe abd pain  Severe pain or swelling or drainage of laceration or incision site  Red streaks or painful masses in the breasts  Foul smelling d/c or lochia  Heavy vaginal bleeding saturating a pad per hour  Sxs of PP depression  Severe headache  Visual changes      Discharge Meds:   Current Outpatient Medications   Medication Sig Dispense Refill    acetaminophen (TYLENOL) 500 MG Tab Take 2 Tablets by mouth every 6 hours as needed for Moderate Pain. 30 Tablet 0    docusate sodium 100 MG Cap Take 100 mg by mouth 2 times a day as needed for Constipation. 60 Capsule 0    ibuprofen (MOTRIN) 800 MG Tab Take 1 Tablet by mouth every 8  hours as needed for Moderate Pain. 30 Tablet 0    norethindrone (MICRONOR) 0.35 MG tablet Take 1 Tablet by mouth every day. Begin taking at 3 wks postpartum 28 Tablet 11       LITZY Juarez.

## 2024-12-25 NOTE — CARE PLAN
Problem: Knowledge Deficit - Postpartum  Goal: Patient will verbalize and demonstrate understanding of self and infant care  Outcome: Progressing     Problem: Psychosocial - Postpartum  Goal: Patient will verbalize and demonstrate effective bonding and parenting behavior  Outcome: Progressing     Problem: Altered Physiologic Condition  Goal: Patient physiologically stable as evidenced by normal lochia, palpable uterine involution and vitals within normal limits  Outcome: Progressing   The patient is Stable - Low risk of patient condition declining or worsening    Shift Goals  Clinical Goals: vitals stable, fundus firm, ambulating and voiding without difficulty  Patient Goals: pain control, infant cares  Family Goals: support, bonding    Progress made toward(s) clinical / shift goals:  Alessandra is postpartum with her firs baby, her vitals are stable, her fundus is firm, she is ambulating and voiding without difficulty, her pain is low and she denies need for pain medication, her SO is at bedside, together they are doing all infant cares independently and asking appropriate questions, both are bonding well with baby    Patient is not progressing towards the following goals:

## 2024-12-25 NOTE — PROGRESS NOTES
0830 - Received report from Brunilda RN at change of shift. Assumed care. Assessment completed. Fundus firm, lochia scant. Plan of care reviewed. Pt will call if med intervention needed. Educated parents to offer feedings on cue, at minimum of Q3 hours and to update I&O chart. Parents verbalized understanding. Encouraged parents to call for assistance when needed. Call light within reach. All questions and concerns answered at this time.     1200 - Oysy0ntdt delivered to patient by this RN. Parents updated on plan of care and that we are awaiting echo and results to be completed prior to discharge. Parents verbalize understanding.    1500 - Discharge paperwork for pt and infant discussed with parents at bedside. All questions answered. Follow up appointment to be made by patient. NBS #2 dates given to parents. Parents verbalize understanding. Paperwork signed and dated at this time.     1525 - Infant discharged in car seat with parents. Infant placed in car seat by parents and checked by RN. Bands verified. Cuddles removed. Sleep sack returned to [facility]. Patient escorted off unit in wheelchair with [facility].

## 2024-12-26 ENCOUNTER — TELEPHONE (OUTPATIENT)
Dept: OBGYN | Facility: CLINIC | Age: 24
End: 2024-12-26
Payer: COMMERCIAL

## 2025-01-14 ENCOUNTER — APPOINTMENT (OUTPATIENT)
Dept: MEDICAL GROUP | Facility: MEDICAL CENTER | Age: 25
End: 2025-01-14
Payer: COMMERCIAL

## 2025-01-15 ENCOUNTER — OFFICE VISIT (OUTPATIENT)
Dept: MEDICAL GROUP | Facility: MEDICAL CENTER | Age: 25
End: 2025-01-15
Payer: COMMERCIAL

## 2025-01-15 VITALS
TEMPERATURE: 97.5 F | DIASTOLIC BLOOD PRESSURE: 84 MMHG | OXYGEN SATURATION: 98 % | HEIGHT: 65 IN | HEART RATE: 93 BPM | SYSTOLIC BLOOD PRESSURE: 112 MMHG | WEIGHT: 146.16 LBS | BODY MASS INDEX: 24.35 KG/M2

## 2025-01-15 DIAGNOSIS — F41.9 ANXIETY: ICD-10-CM

## 2025-01-15 PROCEDURE — 3079F DIAST BP 80-89 MM HG: CPT | Performed by: FAMILY MEDICINE

## 2025-01-15 PROCEDURE — 99214 OFFICE O/P EST MOD 30 MIN: CPT | Performed by: FAMILY MEDICINE

## 2025-01-15 PROCEDURE — 3074F SYST BP LT 130 MM HG: CPT | Performed by: FAMILY MEDICINE

## 2025-01-15 RX ORDER — SERTRALINE HYDROCHLORIDE 25 MG/1
25 TABLET, FILM COATED ORAL DAILY
Qty: 90 TABLET | Refills: 3 | Status: SHIPPED | OUTPATIENT
Start: 2025-01-15

## 2025-01-15 ASSESSMENT — PATIENT HEALTH QUESTIONNAIRE - PHQ9
SUM OF ALL RESPONSES TO PHQ QUESTIONS 1-9: 5
5. POOR APPETITE OR OVEREATING: 0 - NOT AT ALL
CLINICAL INTERPRETATION OF PHQ2 SCORE: 1

## 2025-01-15 ASSESSMENT — ENCOUNTER SYMPTOMS
CHILLS: 0
DEPRESSION: 0
FEVER: 0

## 2025-01-15 NOTE — PROGRESS NOTES
Verbal consent was acquired by the patient to use [employer] ambient listening note generation during this visit.      Alessandra was seen today for follow-up.    Diagnoses and all orders for this visit:    Anxiety  -     sertraline (ZOLOFT) 25 MG tablet; Take 1 Tablet by mouth every day.  -     Referral to Behavioral Health             Assessment & Plan  1. Generalized anxiety disorder  Chronic condition, unstable  - Reports experiencing generalized anxiety, particularly postpartum  -Discussed with patient about SSRI use during postpartum.  And breast-feeding.  Discussed risks and benefits.  - Previously on Zoloft 25 mg during pregnancy and found it effective  - Prescription for Zoloft 25 mg, quantity 90 with 3 refills, to be sent to [facility] pharmacy  - Advised to inform pediatrician about the medication due to its presence in breast milk  - Referral for therapy to be initiated      Follow-up  - Patient to follow up in 2 months          Chief complaint::The encounter diagnosis was Anxiety.      History of Present Illness  The patient is a 24-year-old female who presents to the office to discuss restarting medication. She is 1 month postpartum.    Generalized Anxiety  - She has been experiencing intermittent symptoms of generalized anxiety throughout her pregnancy, which she attributes to hormonal changes.  - She expresses a desire to resume both pharmacological treatment and therapy, having found Zoloft particularly effective in managing her symptoms.  - She discontinued her use of Zoloft 25 mg during her pregnancy due to safety concerns.  - She is currently breastfeeding.  - She recalls undergoing a Pap smear early in her pregnancy.  - She has a scheduled 6-week postpartum appointment.  - She recently initiated birth control on Tuesday.  ft      Review of Systems   Constitutional:  Negative for chills and fever.   Psychiatric/Behavioral:  Negative for depression.         Anxiety symptoms          Medications and  "Allergies:     Current Outpatient Medications   Medication Sig Dispense Refill    sertraline (ZOLOFT) 25 MG tablet Take 1 Tablet by mouth every day. 90 Tablet 3    norethindrone (MICRONOR) 0.35 MG tablet Take 1 Tablet by mouth every day. Begin taking at 3 wks postpartum 28 Tablet 11    Prenatal MV-Min-Fe Fum-FA-DHA (PRENATAL 1 PO) Take  by mouth.       No current facility-administered medications for this visit.       /84 (BP Location: Left arm, Patient Position: Sitting, BP Cuff Size: Adult)   Pulse 93   Temp 36.4 °C (97.5 °F) (Temporal)   Ht 1.651 m (5' 5\")   Wt 66.3 kg (146 lb 2.6 oz)   SpO2 98% , Body mass index is 24.32 kg/m².      Physical Exam  Constitutional:       Appearance: Normal appearance. She is well-developed and well-groomed.   HENT:      Head: Normocephalic and atraumatic.      Right Ear: External ear normal.      Left Ear: External ear normal.   Eyes:      General:         Right eye: No discharge.         Left eye: No discharge.      Conjunctiva/sclera: Conjunctivae normal.   Cardiovascular:      Rate and Rhythm: Normal rate.   Pulmonary:      Effort: Pulmonary effort is normal. No respiratory distress.   Musculoskeletal:      Cervical back: Neck supple.   Skin:     Findings: No rash.   Neurological:      Mental Status: She is alert.   Psychiatric:         Mood and Affect: Mood and affect normal.         Behavior: Behavior normal.                  Please note that this dictation was created using voice recognition software. I have made every reasonable attempt to correct obvious errors, but I expect that there are errors of grammar and possibly content that I did not discover before finalizing the note.      "

## 2025-02-05 ENCOUNTER — HOSPITAL ENCOUNTER (OUTPATIENT)
Facility: MEDICAL CENTER | Age: 25
End: 2025-02-05
Attending: OBSTETRICS & GYNECOLOGY
Payer: COMMERCIAL

## 2025-02-05 ENCOUNTER — POST PARTUM (OUTPATIENT)
Dept: OBGYN | Facility: CLINIC | Age: 25
End: 2025-02-05
Payer: COMMERCIAL

## 2025-02-05 VITALS — WEIGHT: 144 LBS | DIASTOLIC BLOOD PRESSURE: 80 MMHG | SYSTOLIC BLOOD PRESSURE: 112 MMHG | BODY MASS INDEX: 23.96 KG/M2

## 2025-02-05 DIAGNOSIS — Z30.09 GENERAL COUNSELING AND ADVICE FOR CONTRACEPTIVE MANAGEMENT: ICD-10-CM

## 2025-02-05 PROBLEM — Z34.00 PREGNANCY, SUPERVISION OF FIRST: Status: RESOLVED | Noted: 2024-10-18 | Resolved: 2025-02-05

## 2025-02-05 LAB
C TRACH DNA GENITAL QL NAA+PROBE: NEGATIVE
N GONORRHOEA DNA GENITAL QL NAA+PROBE: NEGATIVE
SPECIMEN SOURCE: NORMAL

## 2025-02-05 PROCEDURE — 87624 HPV HI-RISK TYP POOLED RSLT: CPT

## 2025-02-05 PROCEDURE — 3074F SYST BP LT 130 MM HG: CPT | Performed by: OBSTETRICS & GYNECOLOGY

## 2025-02-05 PROCEDURE — 0503F POSTPARTUM CARE VISIT: CPT | Performed by: OBSTETRICS & GYNECOLOGY

## 2025-02-05 PROCEDURE — 88142 CYTOPATH C/V THIN LAYER: CPT

## 2025-02-05 PROCEDURE — 87591 N.GONORRHOEAE DNA AMP PROB: CPT

## 2025-02-05 PROCEDURE — 87491 CHLMYD TRACH DNA AMP PROBE: CPT

## 2025-02-05 PROCEDURE — 3079F DIAST BP 80-89 MM HG: CPT | Performed by: OBSTETRICS & GYNECOLOGY

## 2025-02-05 RX ORDER — ACETAMINOPHEN AND CODEINE PHOSPHATE 120; 12 MG/5ML; MG/5ML
1 SOLUTION ORAL DAILY
Qty: 28 TABLET | Refills: 11 | Status: SHIPPED | OUTPATIENT
Start: 2025-02-05

## 2025-02-05 ASSESSMENT — EDINBURGH POSTNATAL DEPRESSION SCALE (EPDS)
I HAVE FELT SCARED OR PANICKY FOR NO GOOD REASON: NO, NOT AT ALL
I HAVE BLAMED MYSELF UNNECESSARILY WHEN THINGS WENT WRONG: NO, NEVER
I HAVE BEEN ABLE TO LAUGH AND SEE THE FUNNY SIDE OF THINGS: AS MUCH AS I ALWAYS COULD
THINGS HAVE BEEN GETTING ON TOP OF ME: NO, I HAVE BEEN COPING AS WELL AS EVER
I HAVE BEEN ANXIOUS OR WORRIED FOR NO GOOD REASON: NO, NOT AT ALL
I HAVE FELT SAD OR MISERABLE: NO, NOT AT ALL
I HAVE LOOKED FORWARD WITH ENJOYMENT TO THINGS: AS MUCH AS I EVER DID
I HAVE BEEN SO UNHAPPY THAT I HAVE HAD DIFFICULTY SLEEPING: NOT AT ALL
I HAVE BEEN SO UNHAPPY THAT I HAVE BEEN CRYING: NO, NEVER
THE THOUGHT OF HARMING MYSELF HAS OCCURRED TO ME: NEVER
TOTAL SCORE: 0

## 2025-02-05 NOTE — PROGRESS NOTES
Pt here today for postpartum exam.  Delivery type:Vaginal  Currently :Breast feeding  Desired BCM: Pill  LMP: None  Last pap: Pt states 2 years   Phone # [phone]  EPDS: 0

## 2025-02-05 NOTE — PROGRESS NOTES
Postpartum Visit    Alessandra Arita    24 y.o.    Chief complaint    No chief complaint on file.      Patient here for six week post partum check    s/p  on 24    Denies excess pain, bleeding, fever or depression/sadness.    EPDS: 0.  Has resumed her zoloft for anxiety and has follow up with her therapist, doing well.     No problems with bowel/bladder.     Patient is breast feeding.    Plans for birth control -desires to restart micronor.  Other BC options discussed including LARCS.     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     PE: /80   Wt 144 lb   LMP 2024   BMI 23.96 kg/m²     Alert NAD    Breasts: Symmetric, no masses or tenderness. Bilaterally engorged.     Abdomen: Soft, no masses, no tenderness    Ext: no edema    Pelvic: Normal external, Speculum exam, normal vagina, no lesions or discharge, cervix normal, pap collected, no discharge, uterus normal size, nontender.    A/P:    1. Postpartum care and examination    2. General counseling and advice for contraceptive management        Orders Placed This Encounter    THINPREP RFLX HPV ASCUS W/CTNG    Prenatal Multivit-Min-Fe-FA (JENLIVA PRENATAL/) 1 MG Cap    norethindrone (MICRONOR) 0.35 MG tablet        6 week post partum check, s/p .    Doing well    Resume normal activities. Note to return to work with no restrictions provided.     Patient has minipill for birth control. Follow up in future if deciding on alternative birth control.     Return in 1 year for annual.     Rojelio Cormier M.D.    Obstetrics and Gynecology    2025 11:06 AM

## 2025-02-05 NOTE — LETTER
February 5, 2025    To Whom It May Concern:         This is confirmation that Alessandra Arita attended her scheduled appointment with Rojelio Cormier M.D. on 2/05/25. The patient is also allowed to return to work full duty as usual with all restrictions lifted.  If you have any questions please do not hesitate to call me at the phone number listed below.    Sincerely,          Dionna Power, Med Ass't  [phone]

## 2025-02-05 NOTE — LETTER
February 5, 2025    To Whom It May Concern:         This is confirmation that Alessandrasurjit HawthorneOanh Juan J attended her scheduled appointment with Rojelio Cormier M.D. on 2/05/25.         If you have any questions please do not hesitate to call me at the phone number listed below.    Sincerely,          Dionna Power, Med Ass't  [phone]

## 2025-02-14 LAB — THINPREP PAP, CYTOLOGY NL11781: NORMAL

## 2025-02-19 LAB
HPV I/H RISK 1 DNA SPEC QL NAA+PROBE: NOT DETECTED
SPECIMEN SOURCE: NORMAL